# Patient Record
Sex: FEMALE | Race: WHITE | NOT HISPANIC OR LATINO | URBAN - METROPOLITAN AREA
[De-identification: names, ages, dates, MRNs, and addresses within clinical notes are randomized per-mention and may not be internally consistent; named-entity substitution may affect disease eponyms.]

---

## 2018-08-30 ENCOUNTER — INPATIENT (INPATIENT)
Facility: HOSPITAL | Age: 83
LOS: 5 days | Discharge: NURSING, REHAB OR LONG-TERM CARE | End: 2018-09-05
Attending: INTERNAL MEDICINE | Admitting: INTERNAL MEDICINE
Payer: MEDICARE

## 2018-08-30 VITALS
OXYGEN SATURATION: 100 % | HEART RATE: 70 BPM | DIASTOLIC BLOOD PRESSURE: 125 MMHG | RESPIRATION RATE: 16 BRPM | SYSTOLIC BLOOD PRESSURE: 158 MMHG

## 2018-08-30 DIAGNOSIS — I63.9 CEREBRAL INFARCTION, UNSPECIFIED: ICD-10-CM

## 2018-08-30 LAB
ALBUMIN SERPL ELPH-MCNC: 4 G/DL — SIGNIFICANT CHANGE UP (ref 3.3–5)
ALP SERPL-CCNC: 67 U/L — SIGNIFICANT CHANGE UP (ref 40–120)
ALT FLD-CCNC: 24 U/L — SIGNIFICANT CHANGE UP (ref 4–33)
APTT BLD: 26.7 SEC — LOW (ref 27.5–37.4)
AST SERPL-CCNC: 32 U/L — SIGNIFICANT CHANGE UP (ref 4–32)
BASE EXCESS BLDV CALC-SCNC: 1.6 MMOL/L — SIGNIFICANT CHANGE UP
BASOPHILS # BLD AUTO: 0.04 K/UL — SIGNIFICANT CHANGE UP (ref 0–0.2)
BASOPHILS NFR BLD AUTO: 0.6 % — SIGNIFICANT CHANGE UP (ref 0–2)
BILIRUB SERPL-MCNC: 0.3 MG/DL — SIGNIFICANT CHANGE UP (ref 0.2–1.2)
BLD GP AB SCN SERPL QL: NEGATIVE — SIGNIFICANT CHANGE UP
BLOOD GAS VENOUS - CREATININE: 1.06 MG/DL — SIGNIFICANT CHANGE UP (ref 0.5–1.3)
BUN SERPL-MCNC: 27 MG/DL — HIGH (ref 7–23)
CALCIUM SERPL-MCNC: 9.1 MG/DL — SIGNIFICANT CHANGE UP (ref 8.4–10.5)
CHLORIDE BLDV-SCNC: 107 MMOL/L — SIGNIFICANT CHANGE UP (ref 96–108)
CHLORIDE SERPL-SCNC: 104 MMOL/L — SIGNIFICANT CHANGE UP (ref 98–107)
CK MB BLD-MCNC: 1.81 NG/ML — SIGNIFICANT CHANGE UP (ref 1–4.7)
CK SERPL-CCNC: 47 U/L — SIGNIFICANT CHANGE UP (ref 25–170)
CO2 SERPL-SCNC: 20 MMOL/L — LOW (ref 22–31)
CREAT SERPL-MCNC: 1.03 MG/DL — SIGNIFICANT CHANGE UP (ref 0.5–1.3)
EOSINOPHIL # BLD AUTO: 0.11 K/UL — SIGNIFICANT CHANGE UP (ref 0–0.5)
EOSINOPHIL NFR BLD AUTO: 1.7 % — SIGNIFICANT CHANGE UP (ref 0–6)
GAS PNL BLDV: 136 MMOL/L — SIGNIFICANT CHANGE UP (ref 136–146)
GLUCOSE BLDV-MCNC: 157 — HIGH (ref 70–99)
GLUCOSE SERPL-MCNC: 152 MG/DL — HIGH (ref 70–99)
HCO3 BLDV-SCNC: 25 MMOL/L — SIGNIFICANT CHANGE UP (ref 20–27)
HCT VFR BLD CALC: 42.1 % — SIGNIFICANT CHANGE UP (ref 34.5–45)
HCT VFR BLDV CALC: 43.2 % — SIGNIFICANT CHANGE UP (ref 34.5–45)
HGB BLD-MCNC: 13.8 G/DL — SIGNIFICANT CHANGE UP (ref 11.5–15.5)
HGB BLDV-MCNC: 14.1 G/DL — SIGNIFICANT CHANGE UP (ref 11.5–15.5)
IMM GRANULOCYTES # BLD AUTO: 0.02 # — SIGNIFICANT CHANGE UP
IMM GRANULOCYTES NFR BLD AUTO: 0.3 % — SIGNIFICANT CHANGE UP (ref 0–1.5)
INR BLD: 1.12 — SIGNIFICANT CHANGE UP (ref 0.88–1.17)
LACTATE BLDV-MCNC: 2.1 MMOL/L — HIGH (ref 0.5–2)
LYMPHOCYTES # BLD AUTO: 2.42 K/UL — SIGNIFICANT CHANGE UP (ref 1–3.3)
LYMPHOCYTES # BLD AUTO: 37.2 % — SIGNIFICANT CHANGE UP (ref 13–44)
MCHC RBC-ENTMCNC: 30.8 PG — SIGNIFICANT CHANGE UP (ref 27–34)
MCHC RBC-ENTMCNC: 32.8 % — SIGNIFICANT CHANGE UP (ref 32–36)
MCV RBC AUTO: 94 FL — SIGNIFICANT CHANGE UP (ref 80–100)
MONOCYTES # BLD AUTO: 0.65 K/UL — SIGNIFICANT CHANGE UP (ref 0–0.9)
MONOCYTES NFR BLD AUTO: 10 % — SIGNIFICANT CHANGE UP (ref 2–14)
NEUTROPHILS # BLD AUTO: 3.27 K/UL — SIGNIFICANT CHANGE UP (ref 1.8–7.4)
NEUTROPHILS NFR BLD AUTO: 50.2 % — SIGNIFICANT CHANGE UP (ref 43–77)
NRBC # FLD: 0 — SIGNIFICANT CHANGE UP
PCO2 BLDV: 42 MMHG — SIGNIFICANT CHANGE UP (ref 41–51)
PH BLDV: 7.4 PH — SIGNIFICANT CHANGE UP (ref 7.32–7.43)
PLATELET # BLD AUTO: 172 K/UL — SIGNIFICANT CHANGE UP (ref 150–400)
PMV BLD: 11.5 FL — SIGNIFICANT CHANGE UP (ref 7–13)
PO2 BLDV: 47 MMHG — HIGH (ref 35–40)
POTASSIUM BLDV-SCNC: 5.2 MMOL/L — HIGH (ref 3.4–4.5)
POTASSIUM SERPL-MCNC: 4.9 MMOL/L — SIGNIFICANT CHANGE UP (ref 3.5–5.3)
POTASSIUM SERPL-SCNC: 4.9 MMOL/L — SIGNIFICANT CHANGE UP (ref 3.5–5.3)
PROT SERPL-MCNC: 7.4 G/DL — SIGNIFICANT CHANGE UP (ref 6–8.3)
PROTHROM AB SERPL-ACNC: 12.9 SEC — SIGNIFICANT CHANGE UP (ref 9.8–13.1)
RBC # BLD: 4.48 M/UL — SIGNIFICANT CHANGE UP (ref 3.8–5.2)
RBC # FLD: 14.6 % — HIGH (ref 10.3–14.5)
RH IG SCN BLD-IMP: POSITIVE — SIGNIFICANT CHANGE UP
RH IG SCN BLD-IMP: POSITIVE — SIGNIFICANT CHANGE UP
SAO2 % BLDV: 82.7 % — SIGNIFICANT CHANGE UP (ref 60–85)
SODIUM SERPL-SCNC: 139 MMOL/L — SIGNIFICANT CHANGE UP (ref 135–145)
TROPONIN T, HIGH SENSITIVITY: 9 NG/L — SIGNIFICANT CHANGE UP (ref ?–14)
WBC # BLD: 6.51 K/UL — SIGNIFICANT CHANGE UP (ref 3.8–10.5)
WBC # FLD AUTO: 6.51 K/UL — SIGNIFICANT CHANGE UP (ref 3.8–10.5)

## 2018-08-30 PROCEDURE — 70498 CT ANGIOGRAPHY NECK: CPT | Mod: 26

## 2018-08-30 PROCEDURE — 70496 CT ANGIOGRAPHY HEAD: CPT | Mod: 26

## 2018-08-30 PROCEDURE — 71045 X-RAY EXAM CHEST 1 VIEW: CPT | Mod: 26

## 2018-08-30 PROCEDURE — 99291 CRITICAL CARE FIRST HOUR: CPT

## 2018-08-30 PROCEDURE — 70450 CT HEAD/BRAIN W/O DYE: CPT | Mod: 26,59

## 2018-08-30 RX ORDER — TETANUS TOXOID, REDUCED DIPHTHERIA TOXOID AND ACELLULAR PERTUSSIS VACCINE, ADSORBED 5; 2.5; 8; 8; 2.5 [IU]/.5ML; [IU]/.5ML; UG/.5ML; UG/.5ML; UG/.5ML
0.5 SUSPENSION INTRAMUSCULAR ONCE
Qty: 0 | Refills: 0 | Status: DISCONTINUED | OUTPATIENT
Start: 2018-08-30 | End: 2018-09-05

## 2018-08-30 RX ORDER — ALTEPLASE 100 MG
5.7 KIT INTRAVENOUS ONCE
Qty: 0 | Refills: 0 | Status: COMPLETED | OUTPATIENT
Start: 2018-08-30 | End: 2018-08-30

## 2018-08-30 RX ORDER — HYDRALAZINE HCL 50 MG
5 TABLET ORAL ONCE
Qty: 0 | Refills: 0 | Status: COMPLETED | OUTPATIENT
Start: 2018-08-30 | End: 2018-08-30

## 2018-08-30 RX ORDER — ALTEPLASE 100 MG
51.4 KIT INTRAVENOUS ONCE
Qty: 0 | Refills: 0 | Status: COMPLETED | OUTPATIENT
Start: 2018-08-30 | End: 2018-08-30

## 2018-08-30 RX ORDER — LABETALOL HCL 100 MG
10 TABLET ORAL ONCE
Qty: 0 | Refills: 0 | Status: COMPLETED | OUTPATIENT
Start: 2018-08-30 | End: 2018-08-30

## 2018-08-30 RX ORDER — CHLORHEXIDINE GLUCONATE 213 G/1000ML
1 SOLUTION TOPICAL
Qty: 0 | Refills: 0 | Status: DISCONTINUED | OUTPATIENT
Start: 2018-08-30 | End: 2018-08-31

## 2018-08-30 RX ADMIN — Medication 5 MILLIGRAM(S): at 20:15

## 2018-08-30 RX ADMIN — ALTEPLASE 51.4 MILLIGRAM(S): KIT at 20:22

## 2018-08-30 RX ADMIN — Medication 5 MILLIGRAM(S): at 20:20

## 2018-08-30 RX ADMIN — ALTEPLASE 342 MILLIGRAM(S): KIT at 20:57

## 2018-08-30 NOTE — CONSULT NOTE ADULT - ATTENDING COMMENTS
I have seen and examined this patient with the stroke neurology team.     History was reviewed with the patient and/or available family members.   ROS: All negative except documented in the HPI.   Neurological exam was performed and agree with exam as documented above.   Laboratory results and imaging studies were reviewed by me.   I agree with the neurology resident note as documented above.    88 years old woman with multiple vascular risk factors including A. fib-on therapeutic anticoagulation with rivaroxaban is evaluated at [facility] for acute onset of left-sided weakness, left facial droop and dysarthria on 8/30. CT brain on admission did not show any evidence of acute infarct or hemorrhage and she was subsequently treated with IV tPA. CTA head and neck showed right PCA P2 occlusion but is otherwise unremarkable. CT brain performed on 8/31 showed hypodensity in thalamocapsular region concerning for acute infarct to my eye.     Impression:  Cerebral embolism with cerebral infarction. Right MCA versus PCA distribution stroke - likely etiology being in the setting of underlying atrial fibrillation (suspect rivaroxaban failure) versus small vessel disease, during the former    Plan:   - Continue with  24 hours post IV tPA precautions including BP goal<180/105 mmHg for first 24 hours followed by gradual normotension as per protocol  - Not a candidate for neurosurgical intervention due to CTA H/N findings   - MRI brain without contrast; alternatively, could perform CT brain in about 48 hours to evaluate for the size of the infarct   - Aspirin and pharmacological DVT prophylaxis to be considered at 24 hours after the IV tPA and after repeating brain imaging, SCDs for DVT prophylaxis in the interim  - Would optimally benefit from restarting therapeutic anticoagulation (prefer DOACs like apixaban) for secondary stroke prevention in about 48-72 hours depending upon the size of the infarct on repeat brain imaging   - Atorvastatin 80 mg at bedtime after establishing enteral access or passing swallow evaluation  - TTE with bubble study and continue with telemetry   - Management of A. Fib as per primary team   - HbA1C and LDL, continue with aggressive vascular risk factors modifications   - PT/OT/Speech and swallow evaluation   - Would continue to follow    Above mentioned plan was discussed with patient, her son over the phone and her home health aide at the bedside in detail. All the questions were answered and concerns were addressed. More than 84 minutes were spent in evaluation and management of this critically ill and unstable patient with acute stroke.

## 2018-08-30 NOTE — H&P ADULT - HISTORY OF PRESENT ILLNESS
Pt is a 87 yo woman w/ afib ( on xarelto and metoprolol), HLD, anxiety presented to ED complaining of L sided weakness. Pt reported that she was eating dinner with a friend this afternoon when patient suddenly "did not feel well." Pt reported that she had finished dinner, paid the bill, and was on the way out of the restaurant when she started having difficulty moving her left leg and left arm. Pt stated that she "fainted," however son stated that she did not fall or hit her head. Pt reported that she did not experience any N/V/D, CP, SOB, dyspnea, or abdominal pain.     In the ED:   Vital Signs Last 24 Hrs  T(C): 36.7 (30 Aug 2018 21:33), Max: 36.7 (30 Aug 2018 21:33)  T(F): 98 (30 Aug 2018 21:33), Max: 98 (30 Aug 2018 21:33)  HR: 77 (30 Aug 2018 22:30) (65 - 77)  BP: 143/54 (30 Aug 2018 22:30) (140/49 - 188/75)  BP(mean): 80 (30 Aug 2018 22:30) (77 - 89)  RR: 21 (30 Aug 2018 22:30) (16 - 24)  SpO2: 96% (30 Aug 2018 22:30) (95% - 100%)    Stroke code was called (NIH stroke scale: 7), patient was given tPa and admitted to MICU. Pt is a 89 yo woman w/ afib (on xarelto, metoprolol, amiodarone), HLD, anxiety presented to ED complaining of L sided weakness. Pt reported that she was eating dinner with a friend this afternoon when patient suddenly "did not feel well." Pt reported that she had finished dinner, paid the bill, and was on the way out of the restaurant when she started having difficulty moving her left leg and left arm. Pt stated that she "fainted," however son stated that she did not fall or hit her head. Pt reported that she did not experience any N/V/D, CP, SOB, dyspnea, or abdominal pain.     In the ED:   Vital Signs Last 24 Hrs  T(C): 36.7 (30 Aug 2018 21:33), Max: 36.7 (30 Aug 2018 21:33)  T(F): 98 (30 Aug 2018 21:33), Max: 98 (30 Aug 2018 21:33)  HR: 77 (30 Aug 2018 22:30) (65 - 77)  BP: 143/54 (30 Aug 2018 22:30) (140/49 - 188/75)  BP(mean): 80 (30 Aug 2018 22:30) (77 - 89)  RR: 21 (30 Aug 2018 22:30) (16 - 24)  SpO2: 96% (30 Aug 2018 22:30) (95% - 100%)    Stroke code was called (NIH stroke scale: 7), patient was given tPa and admitted to MICU.

## 2018-08-30 NOTE — ED ADULT TRIAGE NOTE - CADM TRG TX PRIOR TO ARRIVAL
[facility]    Jomar Cabral Jr. is a 85 y.o. male who is now on pharmacy to dose enoxaparin for ppx per Olash's request.     He  has a past medical history of Acquired hypothyroidism (3/8/2016); Acquired pes planus; Actinic keratosis; Alzheimer disease; Alzheimer's dementia (3/8/2016); Anemia; Anxiety; Atrial fibrillation (3/8/2016); BPH (benign prostatic hyperplasia); Cancer; Colon polyps; Depression; Disease of thyroid gland; Diverticulosis; Essential hypertension (3/8/2016); HTN (hypertension); Hyperlipidemia; Hypertension; Hypothyroid; Neoplasm of pituitary gland; Osteoarthritis of multiple joints (3/8/2016); Osteoporosis; Osteoporosis; Partial small bowel obstruction; Renal insufficiency; RLS (restless legs syndrome); Sleep apnea (3/8/2016); Venous insufficiency; and Vertigo.    Relevant clinical data and objective history reviewed:  70\" (177.8 cm)  188 lb (85.3 kg)  Body mass index is 26.98 kg/(m^2).     Home Anticoagulation: none, but on asa     Allergies as of 03/10/2017 - Bishnu as Reviewed 03/10/2017   Allergen Reaction Noted   • No known drug allergy  03/06/2016     Lab Results   Component Value Date    WBC 6.89 03/10/2017    HGB 14.2 03/10/2017    HCT 44.0 03/10/2017    MCV 97.6 (H) 03/10/2017     03/10/2017     PLATELETS   Date Value Ref Range Status   03/10/2017 227 140 - 500 10*3/mm3 Final     Estimated Creatinine Clearance: 47.3 mL/min (by C-G formula based on Cr of 1.18).    Assessment/Plan  Reviewed chart   Renal function is acutely impaired   Platelets are wnl     1) enoxaparin 40 mg subcutaneous every 24 hours  2) Labs: will be scheduled as needed       Pharmacy will continue to follow daily while on enoxaparin and adjust as needed.     Radah Plasencia, PharmD, BCPS  3/10/2017 1:44 PM     " none

## 2018-08-30 NOTE — H&P ADULT - NSHPLABSRESULTS_GEN_ALL_CORE
13.8   6.51  )-----------( 172      ( 30 Aug 2018 20:11 )             42.1     Auto Eosinophil # 0.11  / Auto Eosinophil % 1.7   / Auto Neutrophil # 3.27  / Auto Neutrophil % 50.2  / BANDS % x        08-30    139  |  104  |  27<H>  ----------------------------<  152<H>  4.9   |  20<L>  |  1.03    Ca    9.1      30 Aug 2018 20:11  TPro  7.4  /  Alb  4.0  /  TBili  0.3  /  DBili  x   /  AST  32  /  ALT  24  /  AlkPhos  67  08-30    PT/INR - ( 30 Aug 2018 20:11 )   PT: 12.9 SEC;   INR: 1.12          PTT - ( 30 Aug 2018 20:11 )  PTT:26.7 SEC  CARDIAC MARKERS ( 30 Aug 2018 20:11 )  CK 47 u/L / CKMB 1.81 ng/mL      < from: Xray Chest 1 View AP/PA (08.30.18 @ 20:29) >    INTERPRETATION:  Increased interstitial prominence may represent   pulmonary edema in the appropriate clinical context.    < end of copied text >    < from: CT Angio Head w/ IV Cont (08.30.18 @ 20:05) >    CT angiography neck: No evidence of hemodynamically significant stenosis   using NASCET criteria.  Patent vertebral arteries.  No evidence of   vascular dissection.    CT angiography brain: Occlusion of the right posterior cerebral artery.    Stenosis of the left vertebral artery.    No other significant stenosis or vessel occlusion identified.    < end of copied text >    < from: CT Brain Stroke Protocol (08.30.18 @ 19:50) >    IMPRESSION:    No acute intracranial hemorrhage, mass effect or midline shift.    < end of copied text >

## 2018-08-30 NOTE — CONSULT NOTE ADULT - ASSESSMENT
89YO F with HLD and sudden onset left hemiparesis and left facial droop due to acute right hemispheric ischemic event. NIHSS 7 MRS 0. CT Head showed no hemorrhage. tPA bolus given after risks/benefits discussed. CTA Head/Neck shows R PCA occlusion. Likely right thalamo-capsular infarction due to artery to artery occlusion of PCA perforators. Admit to MICU for stroke workup and management.       [] post tPA protocol  [] permissive hypertension for first 24hrs <180/105 post tPA, followed by gradual normotension  [] MRI Brain w/o  [] TTE, tele monitor for Afib, +/- loop  [] Rp Head CT 24hr to start ASA 81 daily and DVT ppx   [] atorvastatin 80 when able to take PO  [] check A1C, lipid panel   [] dysphagia screen  [] speech/swallow  [] PT/OT

## 2018-08-30 NOTE — ED PROVIDER NOTE - ATTENDING CONTRIBUTION TO CARE
Devon: 89 yo female with a h/o a fib on xarelto ( history initially not known by patient or family), dementia and HTN BIBEMS with acute onset left sided upper and lower Devon: 87 yo female with a h/o a fib on xarelto ( history initially not known by patient or family), dementia and HTN BIBEMS with acute onset left sided upper and lower extremity weakness which began while eating dinner. Pt denies headache, chest pain, SOB, recent illness, fevesr and chills. Pt initially denied anticoagulation. Exam: GENERAL: well appearing, NAD, HEENT: MMM, PERRLA, CARDIO: +S1/S2, no murmurs, rubs or gallops, LUNGS: CTA B/L, no wheezing, rales or rhonchi, ABD: soft, nontender, BSx4 quadrants, no guarding or rigidity. EXT: 2/5 LUE strength, 3/5 LLE strength, right sided 5/5.  2+ distal pulses x 4 extremities. NEURO: AxOx3, left sided facial droop SKIN: no rashes or lesions, well perfused A/P- 87 yo female with CVA. code stroke called, neuro evaluated, will likely receive TPA.

## 2018-08-30 NOTE — ED ADULT NURSE NOTE - OBJECTIVE STATEMENT
Pt s/p Code Stroke x1hr prior. Pt was at dinner with family when she "didn't feel well" stood up from table and slid down to floor. EMS arrived and found pt unable to move left arm and leg and left sided facial droop. Pt A&O x3, repeats same questions over and over. CT & CTA performed. Neuro recc TPA and TPA bolus given at 2022 after BP control w/ hydralazine. 2 PIV places and labs drawn and sent as ordered.

## 2018-08-30 NOTE — ED PROVIDER NOTE - PROGRESS NOTE DETAILS
[place] stroke eval - 87yo female no pmh, on no meds, p/w 1 hour left sided facial droop and left upper and lower extremity weakness, started at dinner. On exam, left facial droop noted, left sided sensory deficit, left upper and lower extremity weakness with pronator drift. fingerstick 153, code stroke called and neurology called. pt taken to CT scanner Devon: Pt evaluated by myself and neurology prior to CT, CT non con negative, pt consented for TPA Devon: TPA pushed at 8:22

## 2018-08-30 NOTE — ED PROVIDER NOTE - PHYSICAL EXAMINATION
***GEN - well appearing; NAD   ***HEAD - NC/AT  ***EYES/NOSE - PEERL, EOMI, mucous membranes moist, no discharge   ***THROAT: Oral cavity and pharynx normal. No inflammation, swelling, exudate, or lesions.    ***NECK: supple, non-tender no lymphadenopathy  ***PULMONARY - CTA b/l, symmetric breath sounds.   ***CARDIAC- s1s2, RRR, no murmur  ***ABDOMEN - +BS, ND, NT, soft, no guarding, no rebound, no organomegaly  ***BACK - no CVA tenderness, Normal  spine, no spinal TTP  ***EXTREMITIES - symmetric pulses, 2+ dp, capillary refill < 2 seconds, no clubbing, no cyanosis, no edema   ***SKIN - warm, dry, intact, no rash or bruising   ***NEUROLOGIC - EOMI, PERRL, VFF, V1-V3 intact, mild L facial droop, tongue midline.  2/5 LUE, 3/5 LLE. RUE/RLE NO Drift. Sensation: decreased sensation on L arm, leg.  Coordination: FTN intact b/l, no dysmetria

## 2018-08-30 NOTE — ED ADULT TRIAGE NOTE - CHIEF COMPLAINT QUOTE
Pt presents with right sided facial droop and left sided weakness.  No slurred speech.  Last seen normal 45 min ago.  JARED called to assess.  Code stroke called.  .  Pt to CT

## 2018-08-30 NOTE — H&P ADULT - NSHPPHYSICALEXAM_GEN_ALL_CORE
GENERAL APPEARANCE: Laying comfortably in bed  HEENT:  PERRL, EOMI. Sclera anicteric;  NECK:  Non-tender without lymphadenopathy or masses   CARDIAC: Normal S1 and S2. No S3, S4 or murmurs. Rhythm is regular.  LUNGS: Clear to auscultation and percussion without rales, rhonchi, wheezing or diminished breath sounds.  ABDOMEN: Positive bowel sounds. Soft, nondistended, nontender. No guarding or rebound.   MUSCULOSKELETAL: No joint erythema or tenderness.   EXTREMITIES: No significant deformity or joint abnormality. No edema. Peripheral pulses intact.   NEUROLOGICAL: PERRL, EOMI, Decreased sensation on left side of face V1-V3, left side facial droop, hearing intact to finger rub, uvula midline, SCM/trap L: 5/5  R: 4/5, tongue protrudes midline. Strength 5/5 on R. Strength 3/5 on left upper and lower extremities. Decreased sensation in left upper and lower extremities.   SKIN: Skin normal color, texture and turgor with no lesions or eruptions.  PSYCHIATRIC: AOx3. Pt anxious, worried.

## 2018-08-30 NOTE — ED ADULT NURSE NOTE - NSIMPLEMENTINTERV_GEN_ALL_ED
Implemented All Fall with Harm Risk Interventions:  [place] to call system. Call bell, personal items and telephone within reach. Instruct patient to call for assistance. Room bathroom lighting operational. Non-slip footwear when patient is off stretcher. Physically safe environment: no spills, clutter or unnecessary equipment. Stretcher in lowest position, wheels locked, appropriate side rails in place. Provide visual cue, wrist band, yellow gown, etc. Monitor gait and stability. Monitor for mental status changes and reorient to person, place, and time. Review medications for side effects contributing to fall risk. Reinforce activity limits and safety measures with patient and family. Provide visual clues: red socks.

## 2018-08-30 NOTE — H&P ADULT - ASSESSMENT
Pt is a 89 yo woman w/ afib ( on xarelto and metoprolol), HLD, anxiety presented to ED complaining of L sided weakness 2/2 to R sided CVA (R PCA occlusion on CTA) s/p tPa admitted to MICU for close neurological follow up. Pt is a 87 yo woman w/ afib ( on xarelto and metoprolol), HLD, anxiety presented to ED complaining of L sided weakness 2/2 to R sided CVA (R PCA occlusion on CTA) s/p tPa admitted to MICU for close neurological follow up.     #Neuro  - Pt alert and oriented   - S/p tPa  - CT Head negative for acute bleed/infarct  - CTA Head showed R PCA occlusion  - q30 min neuro checks  - Repeat 24 hr CT Head to start ASA 81 daily and DVT ppx as per neurology   - Speech and swallow evaluation  - PT/OT    #Resp  - Currently O2 sat: > 95 on room air, no respiratory distress  - Continue monitoring respiratory status    #Cardio   - hx of afib (on xarelto/ metoprolol); EKG wnl  - permissive hypertension for first 24hrs <180/105 post tPA, followed by gradual normotension per neurology  - tele monitoring for afib    #GI  - No PPI drip indicated    #Endocrine  -     #ID  - no concern for infection    #Metabolic  - No concern for metabolic disturbances    #Prophylaxis   - S/p tPa Pt is a 89 yo woman w/ afib ( on xarelto and metoprolol), HLD, anxiety presented to ED complaining of L sided weakness 2/2 to R sided CVA (R PCA occlusion on CTA) s/p tPa admitted to MICU for close neurological follow up.     #Neuro  - Pt alert and oriented   - S/p tPa; NIHSS: 7  - Initial CT Head negative for acute bleed/infarct  - CTA Head showed R PCA occlusion; L vertebral artery stenosis  - frequent neuro checks  - Repeat 24 hr CT Head to r/o hemorraghic conversion to start ASA 81 daily and DVT ppx as per neurology   - Speech and swallow evaluation  - TTE to r/o thrombus/ embolic phenomena  - PT/OT    #Resp  - Currently O2 sat: > 95 on room air, no respiratory distress  - Continue monitoring respiratory status    #Cardio   - hx of afib (on xarelto/ metoprolol); Rate controlled at this time off metoprolol; EKG NSR.  - holding home metoprolol until repeat head CT for permissive hypertension for first 24hrs   - Goal BP: <180/105 post tPA, followed by gradual normotension per neurology  - tele monitoring for afib    #GI  - No acute issues    #ID  - no concern for infection    #Metabolic  - No concern for metabolic disturbances    #Prophylaxis   - Holding VTE prophylaxis pending 24 hour neuro checks    Mitch Zepeda MD PGY1 Pt is a 89 yo woman w/ afib ( on xarelto and metoprolol), HLD, anxiety presented to ED complaining of L sided weakness 2/2 to R sided CVA (R PCA occlusion on CTA) s/p tPa admitted to MICU for close neurological follow up.     #Neuro  - Pt alert and oriented   - S/p tPa; NIHSS: 7  - Initial CT Head negative for acute bleed/infarct  - CTA Head showed R PCA occlusion; L vertebral artery stenosis  - frequent neuro checks  - Repeat 24 hr CT Head to r/o hemorraghic conversion to start ASA 81 daily and DVT ppx as per neurology   - Speech and swallow evaluation  - TTE to r/o thrombus/ embolic phenomena  - PT/OT    #Resp  - Currently O2 sat: > 95 on room air, no respiratory distress  - Continue monitoring respiratory status    #Cardio   - hx of afib (on xarelto/ metoprolol/ amiodarone); Rate/rhythm controlled at this time off metoprolol/amio; EKG NSR.  - holding home metoprolol/amiodarone until repeat head CT for permissive hypertension for first 24hrs   - Goal BP: <180/105 post tPA, followed by gradual normotension per neurology  - tele monitoring for afib    #GI  - No acute issues    #ID  - no concern for infection    #Metabolic  - No concern for metabolic disturbances    #Prophylaxis   - Holding VTE prophylaxis pending 24 hour neuro checks    Mitch Zepeda MD PGY1

## 2018-08-30 NOTE — H&P ADULT - NSHPREVIEWOFSYSTEMS_GEN_ALL_CORE
CONSTITUTIONAL:  No weight loss, fever, chills, weakness or fatigue.  HEENT:  Eyes:  No visual loss Throat:  No hearing loss, sneezing, congestion, runny nose or sore throat.  SKIN:  No rash or itching.  CARDIOVASCULAR:  No chest pain, chest pressure or chest discomfort. No palpitations   RESPIRATORY:  No shortness of breath, dyspnea  GASTROINTESTINAL:  No anorexia, nausea, vomiting or diarrhea. No abdominal pain or blood.  GENITOURINARY:  Denies hematuria, dysuria.   NEUROLOGICAL:  See HPI  MUSCULOSKELETAL:  See HPI  HEMATOLOGIC:  No anemia, bleeding or bruising.  LYMPHATICS:  No enlarged nodes.   PSYCHIATRIC:  Hx of anxiety  ENDOCRINOLOGIC:  No reports of sweating, cold or heat intolerance. No polyuria or polydipsia.  ALLERGIES:  No history of asthma, hives, eczema or rhinitis.

## 2018-08-30 NOTE — CONSULT NOTE ADULT - SUBJECTIVE AND OBJECTIVE BOX
NEUROLOGY CONSULT     87 YO RHF with HLD on no meds at home presents with sudden onset L sided weakness and facial droop, slid out of chair while sitting down at dinner with her cousin and unable to get up. No shaking like activity or LOC. No similar symptoms in the past. EMS was immediately called and code stroke called in ED. Last known normal about 1 hour prior ~1845. Upon my arrival pt with mild-moderate L facial droop, L arm 2/5, L leg 3/5, and decreased sensation on L side. No gaze deviation/preference or cortical signs. NIHSS 7. CT Head shows no hemorrhage. No contraindications to tPA. RIsks/ benefits discussed with pt in detail and agrees with tPA - pushed at 20:22. No other associated symptoms or complaints.       PAST MEDICAL & SURGICAL HISTORY:  Hyperlipidemia  Familial tremor      Allergies    No Known Allergies    Intolerances        MEDICATIONS  (STANDING):  alteplase    Bolus 5.7 milliGRAM(s) IV Bolus once  diphtheria/tetanus/pertussis (acellular) Vaccine (ADAcel) 0.5 milliLiter(s) IntraMuscular once  labetalol Injectable 10 milliGRAM(s) IV Push once    MEDICATIONS  (PRN):      SOCIAL HISTORY: Denies tobacco/EtOH/drug use     FAMILY HISTORY:      REVIEW OF SYSTEMS:  CONSTITUTIONAL:  No weight loss, fever, chills, weakness or fatigue.  HEENT:  Eyes:  No visual loss, blurred vision, double vision or yellow sclerae. Ears, Nose, Throat:  No hearing loss, sneezing, congestion, runny nose or sore throat.  SKIN:  No rash or itching.  CARDIOVASCULAR:  No chest pain, chest pressure or chest discomfort. No palpitations or edema.  RESPIRATORY:  No shortness of breath, cough or sputum.  GASTROINTESTINAL:  No anorexia, nausea, vomiting or diarrhea. No abdominal pain or blood.  GENITOURINARY:  denies incontinence/retention   NEUROLOGICAL:  see hpi  MUSCULOSKELETAL:  No muscle, back pain, joint pain or stiffness.  HEMATOLOGIC:  No anemia, bleeding or bruising.  LYMPHATICS:  No enlarged nodes. No history of splenectomy.  PSYCHIATRIC:  No history of depression or anxiety.  ENDOCRINOLOGIC:  No reports of sweating, cold or heat intolerance. No polyuria or polydipsia.      Vital Signs Last 24 Hrs  T(C): --  T(F): --  HR: 65 (30 Aug 2018 20:22) (65 - 70)  BP: 169/78 (30 Aug 2018 20:22) (158/125 - 188/75)  BP(mean): --  RR: 18 (30 Aug 2018 20:22) (16 - 18)  SpO2: 100% (30 Aug 2018 20:22) (100% - 100%)    PHYSICAL EXAM:   General appearance: No acute distress                 Mental Status: AAOx3, fluent speech, follows simple commands, able to name  Cranial Nerves: EOMI, PERRL, VFF, V1-V3 intact, mild L facial, tongue midline  Motor: 2/5 LUE, 3/5 LLE. RUE/RLE NO Drift.  Sensation: decreased sensation on L arm, leg   Coordination: FTN intact b/l, no dysmetria   Gait: deferred     NIHSS 7     LABS:                          13.8   6.51  )-----------( 172      ( 30 Aug 2018 20:11 )             42.1     08-30    139  |  104  |  27<H>  ----------------------------<  152<H>  4.9   |  20<L>  |  1.03    Ca    9.1      30 Aug 2018 20:11    TPro  7.4  /  Alb  4.0  /  TBili  0.3  /  DBili  x   /  AST  32  /  ALT  24  /  AlkPhos  67  08-30      IMAGING:    CT HEAD:    FINDINGS:     There is no acute intra-axial or extra-axial hemorrhage. There is no mass   effect, effacement of the basal cisterns or shift of midline structures.    Prominent ventricles and sulci are compatible with age-appropriate   cerebral volume loss. Patchy lucency in the perivertricular white matter   is consistent with chronic white matter microvascular disease..    The visualized paranasal sinuses, mastoid air cells and middle ear   cavities are clear.    There is no displaced calvarial fracture.      IMPRESSION:    No acute intracranial hemorrhage, mass effect or midline shift.          CT ANGIOGRAPHY BRAIN:  There is abrupt termination of the right posterior cerebral artery at the   P1/P2 junction. The posterior communicating arteries are aplastic or   hypoplastic. The anterior communicating artery is intact. The remaining   cerebral arteries are normal in appearance.    There is stenosis of the distal left vertebral artery at the   vertebrobasilar junction. The remaining vertebral basilar system is   unremarkable. Atherosclerosis is seen involving the left V3 and V4   segments.    No enlarged vascular lesions or clusters of abnormal vessels are noted to   suggest an arterial venous malformation within the field-of-view.    Visualized portions of the superficial and deep venous systems are   unremarkable.      IMPRESSION:     CT angiography neck: No evidence of hemodynamically significant stenosis   using NASCET criteria.  Patent vertebral arteries.  No evidence of   vascular dissection.    CT angiography brain: Occlusion of the right posterior cerebral artery.    Stenosis of the left vertebral artery.    No other significant stenosis or vessel occlusion identified.
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
## 2018-08-30 NOTE — H&P ADULT - NSHPSOCIALHISTORY_GEN_ALL_CORE
Pt live alone at [facility]. Pt ambulates and performs all ADLs without assistance. Pt was former smoker ( ~ 16 yr pack history; quit about 10 years ago). No ETOH or drug use.

## 2018-08-31 LAB
ALBUMIN SERPL ELPH-MCNC: 3.9 G/DL — SIGNIFICANT CHANGE UP (ref 3.3–5)
ALP SERPL-CCNC: 61 U/L — SIGNIFICANT CHANGE UP (ref 40–120)
ALT FLD-CCNC: 20 U/L — SIGNIFICANT CHANGE UP (ref 4–33)
AST SERPL-CCNC: 24 U/L — SIGNIFICANT CHANGE UP (ref 4–32)
BASOPHILS # BLD AUTO: 0.04 K/UL — SIGNIFICANT CHANGE UP (ref 0–0.2)
BASOPHILS NFR BLD AUTO: 0.5 % — SIGNIFICANT CHANGE UP (ref 0–2)
BILIRUB SERPL-MCNC: 0.5 MG/DL — SIGNIFICANT CHANGE UP (ref 0.2–1.2)
BUN SERPL-MCNC: 22 MG/DL — SIGNIFICANT CHANGE UP (ref 7–23)
CALCIUM SERPL-MCNC: 8.9 MG/DL — SIGNIFICANT CHANGE UP (ref 8.4–10.5)
CHLORIDE SERPL-SCNC: 104 MMOL/L — SIGNIFICANT CHANGE UP (ref 98–107)
CHOLEST SERPL-MCNC: 213 MG/DL — HIGH (ref 120–199)
CO2 SERPL-SCNC: 20 MMOL/L — LOW (ref 22–31)
CREAT SERPL-MCNC: 0.79 MG/DL — SIGNIFICANT CHANGE UP (ref 0.5–1.3)
EOSINOPHIL # BLD AUTO: 0.03 K/UL — SIGNIFICANT CHANGE UP (ref 0–0.5)
EOSINOPHIL NFR BLD AUTO: 0.3 % — SIGNIFICANT CHANGE UP (ref 0–6)
GLUCOSE SERPL-MCNC: 117 MG/DL — HIGH (ref 70–99)
HBA1C BLD-MCNC: 5.8 % — HIGH (ref 4–5.6)
HCT VFR BLD CALC: 40.9 % — SIGNIFICANT CHANGE UP (ref 34.5–45)
HDLC SERPL-MCNC: 87 MG/DL — HIGH (ref 45–65)
HGB BLD-MCNC: 13.4 G/DL — SIGNIFICANT CHANGE UP (ref 11.5–15.5)
IMM GRANULOCYTES # BLD AUTO: 0.03 # — SIGNIFICANT CHANGE UP
IMM GRANULOCYTES NFR BLD AUTO: 0.3 % — SIGNIFICANT CHANGE UP (ref 0–1.5)
LIPID PNL WITH DIRECT LDL SERPL: 128 MG/DL — SIGNIFICANT CHANGE UP
LYMPHOCYTES # BLD AUTO: 1.59 K/UL — SIGNIFICANT CHANGE UP (ref 1–3.3)
LYMPHOCYTES # BLD AUTO: 18 % — SIGNIFICANT CHANGE UP (ref 13–44)
MAGNESIUM SERPL-MCNC: 2.3 MG/DL — SIGNIFICANT CHANGE UP (ref 1.6–2.6)
MCHC RBC-ENTMCNC: 30.5 PG — SIGNIFICANT CHANGE UP (ref 27–34)
MCHC RBC-ENTMCNC: 32.8 % — SIGNIFICANT CHANGE UP (ref 32–36)
MCV RBC AUTO: 93 FL — SIGNIFICANT CHANGE UP (ref 80–100)
MONOCYTES # BLD AUTO: 0.97 K/UL — HIGH (ref 0–0.9)
MONOCYTES NFR BLD AUTO: 11 % — SIGNIFICANT CHANGE UP (ref 2–14)
NEUTROPHILS # BLD AUTO: 6.19 K/UL — SIGNIFICANT CHANGE UP (ref 1.8–7.4)
NEUTROPHILS NFR BLD AUTO: 69.9 % — SIGNIFICANT CHANGE UP (ref 43–77)
NRBC # FLD: 0 — SIGNIFICANT CHANGE UP
PHOSPHATE SERPL-MCNC: 2.3 MG/DL — LOW (ref 2.5–4.5)
PLATELET # BLD AUTO: 179 K/UL — SIGNIFICANT CHANGE UP (ref 150–400)
PMV BLD: 11.6 FL — SIGNIFICANT CHANGE UP (ref 7–13)
POTASSIUM SERPL-MCNC: 4.3 MMOL/L — SIGNIFICANT CHANGE UP (ref 3.5–5.3)
POTASSIUM SERPL-SCNC: 4.3 MMOL/L — SIGNIFICANT CHANGE UP (ref 3.5–5.3)
PROT SERPL-MCNC: 7 G/DL — SIGNIFICANT CHANGE UP (ref 6–8.3)
RBC # BLD: 4.4 M/UL — SIGNIFICANT CHANGE UP (ref 3.8–5.2)
RBC # FLD: 14.6 % — HIGH (ref 10.3–14.5)
SODIUM SERPL-SCNC: 139 MMOL/L — SIGNIFICANT CHANGE UP (ref 135–145)
TRIGL SERPL-MCNC: 57 MG/DL — SIGNIFICANT CHANGE UP (ref 10–149)
WBC # BLD: 8.85 K/UL — SIGNIFICANT CHANGE UP (ref 3.8–10.5)
WBC # FLD AUTO: 8.85 K/UL — SIGNIFICANT CHANGE UP (ref 3.8–10.5)

## 2018-08-31 PROCEDURE — 93306 TTE W/DOPPLER COMPLETE: CPT | Mod: 26

## 2018-08-31 PROCEDURE — 99223 1ST HOSP IP/OBS HIGH 75: CPT | Mod: GC

## 2018-08-31 PROCEDURE — 70450 CT HEAD/BRAIN W/O DYE: CPT | Mod: 26,77

## 2018-08-31 PROCEDURE — 70450 CT HEAD/BRAIN W/O DYE: CPT | Mod: 26

## 2018-08-31 PROCEDURE — 70551 MRI BRAIN STEM W/O DYE: CPT | Mod: 26

## 2018-08-31 RX ORDER — ASPIRIN/CALCIUM CARB/MAGNESIUM 324 MG
81 TABLET ORAL DAILY
Qty: 0 | Refills: 0 | Status: DISCONTINUED | OUTPATIENT
Start: 2018-08-31 | End: 2018-09-04

## 2018-08-31 RX ORDER — ONDANSETRON 8 MG/1
4 TABLET, FILM COATED ORAL ONCE
Qty: 0 | Refills: 0 | Status: COMPLETED | OUTPATIENT
Start: 2018-08-31 | End: 2018-08-31

## 2018-08-31 RX ORDER — ENOXAPARIN SODIUM 100 MG/ML
40 INJECTION SUBCUTANEOUS DAILY
Qty: 0 | Refills: 0 | Status: DISCONTINUED | OUTPATIENT
Start: 2018-08-31 | End: 2018-09-04

## 2018-08-31 RX ORDER — ATORVASTATIN CALCIUM 80 MG/1
80 TABLET, FILM COATED ORAL AT BEDTIME
Qty: 0 | Refills: 0 | Status: DISCONTINUED | OUTPATIENT
Start: 2018-08-31 | End: 2018-09-05

## 2018-08-31 RX ADMIN — ATORVASTATIN CALCIUM 80 MILLIGRAM(S): 80 TABLET, FILM COATED ORAL at 23:29

## 2018-08-31 RX ADMIN — ENOXAPARIN SODIUM 40 MILLIGRAM(S): 100 INJECTION SUBCUTANEOUS at 23:29

## 2018-08-31 RX ADMIN — CHLORHEXIDINE GLUCONATE 1 APPLICATION(S): 213 SOLUTION TOPICAL at 08:06

## 2018-08-31 RX ADMIN — ONDANSETRON 4 MILLIGRAM(S): 8 TABLET, FILM COATED ORAL at 03:50

## 2018-08-31 NOTE — SWALLOW BEDSIDE ASSESSMENT ADULT - SWALLOW EVAL: RECOMMENDED FEEDING/EATING TECHNIQUES
allow for swallow between intakes/maintain upright posture during/after eating for 30 mins/no straws/small sips/bites/crush medication (when feasible)/position upright (90 degrees)

## 2018-08-31 NOTE — SWALLOW BEDSIDE ASSESSMENT ADULT - SWALLOW EVAL: DIAGNOSIS
Patient demonstrates symptoms consistent with Mild Oropharyngeal Dysphagia characterized by delayed bolus collection, manipulation and transfer of regular solid textures, with mild lingual residue on left side which cleared with subsequent swallow. Functional oral management noted for puree, mechanical soft and thin liquids. The pharyngeal stage is characterized by a timely pharyngeal trigger with suspect reduced hyolaryngeal excursion upon digital palpation. Strong cough noted after the swallow with regular solids, and after thin liquids consumed via consecutive cup drinking. Mechanical soft solids and thin liquids consumed via small, single cup sips were shown to eliminate cough. Vocal quality remained clear. No additional s/s of penetration/aspiration noted. Patient, her son and HHA were educated at length on diet recommendations and safe swallow strategies (i.e. small/single cup sips, slow rate, no straws) at which time good understanding verbalized.

## 2018-08-31 NOTE — PHYSICAL THERAPY INITIAL EVALUATION ADULT - ACTIVE RANGE OF MOTION EXAMINATION, REHAB EVAL
left UE / LE with active assistive ROM wfl/Right UE Active ROM was WFL (within functional limits)/Right LE Active ROM was WFL (within functional limits)

## 2018-08-31 NOTE — SWALLOW BEDSIDE ASSESSMENT ADULT - SWALLOW EVAL: CRITERIA FOR SKILLED INTERVENTION MET
demonstrates skilled criteria for swallowing intervention to assess for diet texture upgrade and instruct in oral motor exercises/demonstrates skilled criteria for swallowing intervention

## 2018-08-31 NOTE — PROGRESS NOTE ADULT - SUBJECTIVE AND OBJECTIVE BOX
INTERVAL HPI/OVERNIGHT EVENTS:    SUBJECTIVE: Patient seen and examined at bedside. No acute events over night.    CONSTITUTIONAL: No weakness, fevers or chills  EYES/ENT: No visual changes;  No vertigo or throat pain   NECK: No pain or stiffness  RESPIRATORY: No cough, wheezing, hemoptysis; No shortness of breath  CARDIOVASCULAR: No chest pain or palpitations  GASTROINTESTINAL: No abdominal or epigastric pain. No nausea, vomiting, or hematemesis; No diarrhea or constipation. No melena or hematochezia.  GENITOURINARY: No dysuria, frequency or hematuria  NEUROLOGICAL: No numbness or weakness  SKIN: No itching, rashes    OBJECTIVE:    VITAL SIGNS:  ICU Vital Signs Last 24 Hrs  T(C): 36.6 (31 Aug 2018 04:00), Max: 36.7 (30 Aug 2018 21:33)  T(F): 97.8 (31 Aug 2018 04:00), Max: 98 (30 Aug 2018 21:33)  HR: 59 (31 Aug 2018 06:00) (59 - 82)  BP: 103/57 (31 Aug 2018 06:00) (103/57 - 188/75)  BP(mean): 71 (31 Aug 2018 06:00) (71 - 123)  ABP: --  ABP(mean): --  RR: 12 (31 Aug 2018 06:00) (12 - 24)  SpO2: 95% (31 Aug 2018 06:00) (94% - 100%)        08-30 @ 07:01  -  08-31 @ 07:00  --------------------------------------------------------  IN: 0 mL / OUT: 300 mL / NET: -300 mL      CAPILLARY BLOOD GLUCOSE      POCT Blood Glucose.: 153 mg/dL (30 Aug 2018 19:43)      PHYSICAL EXAM:    General: NAD  HEENT: NC/AT; PERRL, clear conjunctiva  Neck: supple  Respiratory: CTA b/l  Cardiovascular: +S1/S2; RRR  Abdomen: soft, NT/ND; +BS x4  Extremities: WWP, 2+ peripheral pulses b/l; no LE edema  Skin: normal color and turgor; no rash  Neurological: Decreased sensation L side of face and UE/LE. 3/5 LUE/LLE strength    MEDICATIONS:  MEDICATIONS  (STANDING):  chlorhexidine 4% Liquid 1 Application(s) Topical <User Schedule>  diphtheria/tetanus/pertussis (acellular) Vaccine (ADAcel) 0.5 milliLiter(s) IntraMuscular once    MEDICATIONS  (PRN):      ALLERGIES:  Allergies    No Known Allergies    Intolerances        LABS:                        13.4   8.85  )-----------( 179      ( 31 Aug 2018 04:00 )             40.9     08-31    139  |  104  |  22  ----------------------------<  117<H>  4.3   |  20<L>  |  0.79    Ca    8.9      31 Aug 2018 04:00  Phos  2.3     08-31  Mg     2.3     08-31    TPro  7.0  /  Alb  3.9  /  TBili  0.5  /  DBili  x   /  AST  24  /  ALT  20  /  AlkPhos  61  08-31    PT/INR - ( 30 Aug 2018 20:11 )   PT: 12.9 SEC;   INR: 1.12          PTT - ( 30 Aug 2018 20:11 )  PTT:26.7 SEC      RADIOLOGY & ADDITIONAL TESTS: Reviewed.

## 2018-08-31 NOTE — PROGRESS NOTE ADULT - ASSESSMENT
87 y/o woman w/ afib ( on xarelto and metoprolol), HLD, anxiety presented to ED complaining of L sided weakness 2/2 to R sided CVA (R PCA occlusion on CTA) s/p tPa admitted to MICU for close neurological follow up.     #Neuro  - CTA Head showed R PCA occlusion; L vertebral artery stenosis s/p tPA  - q1h neuro checks. Plan for CTH 8pm this evening to r/o hemorraghic conversion to start ASA 81 daily and DVT ppx as per neurology   - Speech and swallow evaluation  - TTE to r/o thrombus/ embolic phenomena  - PT/OT    #Resp  - Currently O2 sat: > 95 on room air, no respiratory distress  - Continue monitoring respiratory status    #Cardio   - hx of afib (on xarelto/ metoprolol/ amiodarone); Rate/rhythm controlled at this time off Metoprolol EKG NSR.  - Goal BP: <180/105 post tPA, followed by gradual normotension    #GI  - NPO    #ID  - No active issues    #Metabolic  - No active issues    #Prophylaxis   -  SCD

## 2018-08-31 NOTE — SWALLOW BEDSIDE ASSESSMENT ADULT - ASR SWALLOW ASPIRATION MONITOR
change of breathing pattern/cough/fever/oral hygiene/position upright (90Y)/gurgly voice/throat clearing/upper respiratory infection/pneumonia

## 2018-08-31 NOTE — CHART NOTE - NSCHARTNOTEFT_GEN_A_CORE
MICU Transfer Note    Transfer from: MICU    Transfer to: ( x ) Medicine    (  ) Telemetry     (   ) RCU        (    ) Palliative         (   ) Stroke Unit          (   ) _________________    Accepting Physician:    Sign Out given to:     MICU COURSE:    88 year old female with atrial fibrillation (on Xarelto, metoprolol, amiodarone), hyperlipidemia, anxiety presents to ED after sudden onset L sided weakness witnessed by family while at dinner.  Patient slid out of chair and sunequently unable to get up. No shaking like activity or LOC. EMS was immediately called and code stroke called in ED. Initial neurological exam significant for mild-moderate facial droop with decreased weakness in left arm and leg, and decreased sensation over entire left side. No gaze deviation/preference or cortical signs. Code stroke called. NIHSS score 7 however initial CT head was negative for acute ischemic or hemorrhagic infarct. CTA revealed R. PCA branch occlusion. No contraindications to tPA. Risks/ benefits discussed with patient and t-PA given at 20:22. No other associated symptoms or complaints. Patient admitted to MICU for post tPA-protocol. Patient passed dysphagia screen, seen by PT who recommended RY. Neurological deficits remained stable. Repeat 24-hour CT head revealed acute ischemic in R. basal ganglia. Patient cleared for transfer to Medicine Floor for further management.       ASSESSMENT & PLAN:     88 year old female with atrial fibrillation (on Xarelto, metoprolol, amiodarone), hyperlipidemia, anxiety presents for acute onset left-sided weakness with left facial droop s/p code stroke and t-PA found to have acute ischemic infarct in R. basal ganglia with hemorrhagic conversion.      FOR FOLLOW UP: MICU Transfer Note    Transfer from: MICU    Transfer to: ( x ) Medicine    (  ) Telemetry     (   ) RCU        (    ) Palliative         (   ) Stroke Unit          (   ) _________________    Accepting Physician:    Sign Out given to:     MICU COURSE:    88 year old female with atrial fibrillation (on Xarelto, metoprolol, amiodarone), hyperlipidemia, anxiety presents to ED after sudden onset L sided weakness witnessed by family while at dinner.  Patient slid out of chair and subsequently unable to get up. No shaking like activity or LOC. EMS was immediately called and code stroke called in ED. Initial neurological exam significant for mild-moderate facial droop with decreased weakness in left arm and leg, and decreased sensation over entire left side. No gaze deviation/preference or cortical signs. Code stroke called. NIHSS score 7 however initial CT head was negative for acute ischemic or hemorrhagic infarct. CTA revealed R. PCA branch occlusion. No contraindications to tPA. Risks/ benefits discussed with patient and t-PA given at 20:22. No other associated symptoms or complaints. Patient admitted to MICU for post tPA-protocol. Patient passed formal speech/swallow screen, resumed on oral diet, seen by PT who recommended RY, TTE revealed normal systolic LV function however no Bubble study performed. Neurological deficits remained stable. Repeat 24-hour CT head revealed acute ischemic in R. basal ganglia. ASA and VTE prophylaxis started. Patient cleared for transfer to Medicine Floor for further management.       ASSESSMENT & PLAN:     88 year old female with atrial fibrillation (on Xarelto, metoprolol, amiodarone), hyperlipidemia, anxiety presents for acute onset left-sided weakness with left facial droop s/p code stroke and t-PA found to have acute ischemic infarct in R. basal ganglia with hemorrhagic conversion.      FOR FOLLOW UP:    [ ] f/u official read MRI brain non-con, MRA head & neck  [ ] f/u Neurology recommendations regarding resuming home NOAC  [ ] RY placement    Jessie Graham MD  PGY-3 | MICU Resident  Dept. Internal Medicine MICU Transfer Note    Transfer from: MICU    Transfer to: ( x ) Medicine    (  ) Telemetry     (   ) RCU        (    ) Palliative         (   ) Stroke Unit          (   ) _________________    Accepting Physician:    Sign Out given to:     MICU COURSE:    88 year old female with atrial fibrillation (on Xarelto, metoprolol, amiodarone), hyperlipidemia, anxiety presents to ED after sudden onset L sided weakness witnessed by family while at dinner.  Patient slid out of chair and subsequently unable to get up. No shaking like activity or LOC. EMS was immediately called and code stroke called in ED. Initial neurological exam significant for mild-moderate facial droop with decreased weakness in left arm and leg, and decreased sensation over entire left side. No gaze deviation/preference or cortical signs. Code stroke called. NIHSS score 7 however initial CT head was negative for acute ischemic or hemorrhagic infarct. CTA revealed R. PCA branch occlusion. No contraindications to tPA. Risks/ benefits discussed with patient and t-PA given at 20:22. No other associated symptoms or complaints. Patient admitted to MICU for post tPA-protocol. Patient passed formal speech/swallow screen, resumed on oral diet, seen by PT who recommended RY, TTE revealed normal systolic LV function however no Bubble study performed. Neurological deficits remained stable. Repeat 24-hour CT head revealed acute ischemic in R. basal ganglia. ASA and VTE prophylaxis started. Patient cleared for transfer to Medicine Floor for further management.       ASSESSMENT & PLAN:     88 year old female with atrial fibrillation (on Xarelto, metoprolol, amiodarone), hyperlipidemia, anxiety presents for acute onset left-sided weakness with left facial droop s/p code stroke and t-PA found to have acute ischemic infarct in R. basal ganglia without hemorrhagic conversion.      FOR FOLLOW UP:    [ ] f/u official read MRI brain non-con, MRA head & neck  [ ] f/u Neurology recommendations regarding resuming home NOAC  [ ] RY placement    Jessie Graham MD  PGY-3 | MICU Resident  Dept. Internal Medicine

## 2018-08-31 NOTE — SWALLOW BEDSIDE ASSESSMENT ADULT - COMMENTS
Pt is an 88 year old female with PMHx of afib (on xarelto and metoprolol), HLD, anxiety, who presented to ED complaining of L sided weakness 2/2 to R sided CVA (R PCA occlusion on CTA) s/p tPa, admitted to MICU for close neurological follow up, s/p transfer to CCU.    Patient was received alert, pleasant and cooperative this AM. Patient was able to follow simple one-step directives and communicate basic wants/needs upon informal assessment. Patient's son and HHA present at bedside. Primary RN notified of recommendations. +strong cough after consecutive cup sip drinking; cough eliminated with clinician cue for single cup sips Pt is an 88 year old female with PMHx of afib (on xarelto and metoprolol), HLD, anxiety, who presented to ED complaining of L sided weakness 2/2 to R sided CVA (R PCA occlusion on CTA) s/p tPa, admitted to MICU for close neurological follow up, s/p transfer to CCU.    Patient was received alert, pleasant and cooperative this AM. Patient was able to follow simple one-step directives and communicate basic wants/needs upon informal assessment. Patient's son and HHA present at bedside. Primary RN and Dr. Emery notified of recommendations.

## 2018-08-31 NOTE — SWALLOW BEDSIDE ASSESSMENT ADULT - ORAL PHASE
delayed AP transfer; mild lingual residue on left lingual surface which cleared with subsequent swallow Within functional limits

## 2018-08-31 NOTE — CHART NOTE - NSCHARTNOTEFT_GEN_A_CORE
Briefly, 88 F w/ afib on xarelto p/w acute onset L sided weakness concerning for CVA w/ CT imaging revealing R PCA infarct. Code stroke called in ED and pt given tpa and admitted to MICU. 24H repeat CTH w/ R basal ganglia infarct and no evidence of hemorrhagic conversion. Pt passed speech and swallow and now on mechanical soft diet. PT assessed pt and recommend rehab. Neurology following, MRI head pending read.     For Follow Up:    [] f/u MRI brain read  [] q4h neuro checks  [] f/u neuro regarding resuming noac  [] TTE w/ no mention of bubble study, consider repeat vs clarifying with echo tech  [] dispo planning to rehab per PT. f/u OT  [] started on asa and lipitor for secondary prevention    James Suero PGY3

## 2018-09-01 LAB
BUN SERPL-MCNC: 16 MG/DL — SIGNIFICANT CHANGE UP (ref 7–23)
CALCIUM SERPL-MCNC: 9.1 MG/DL — SIGNIFICANT CHANGE UP (ref 8.4–10.5)
CHLORIDE SERPL-SCNC: 105 MMOL/L — SIGNIFICANT CHANGE UP (ref 98–107)
CO2 SERPL-SCNC: 25 MMOL/L — SIGNIFICANT CHANGE UP (ref 22–31)
CREAT SERPL-MCNC: 0.86 MG/DL — SIGNIFICANT CHANGE UP (ref 0.5–1.3)
GLUCOSE SERPL-MCNC: 102 MG/DL — HIGH (ref 70–99)
HCT VFR BLD CALC: 41.1 % — SIGNIFICANT CHANGE UP (ref 34.5–45)
HGB BLD-MCNC: 13.3 G/DL — SIGNIFICANT CHANGE UP (ref 11.5–15.5)
MAGNESIUM SERPL-MCNC: 2.3 MG/DL — SIGNIFICANT CHANGE UP (ref 1.6–2.6)
MCHC RBC-ENTMCNC: 30.6 PG — SIGNIFICANT CHANGE UP (ref 27–34)
MCHC RBC-ENTMCNC: 32.4 % — SIGNIFICANT CHANGE UP (ref 32–36)
MCV RBC AUTO: 94.5 FL — SIGNIFICANT CHANGE UP (ref 80–100)
NRBC # FLD: 0 — SIGNIFICANT CHANGE UP
PHOSPHATE SERPL-MCNC: 4.2 MG/DL — SIGNIFICANT CHANGE UP (ref 2.5–4.5)
PLATELET # BLD AUTO: 170 K/UL — SIGNIFICANT CHANGE UP (ref 150–400)
PMV BLD: 11.6 FL — SIGNIFICANT CHANGE UP (ref 7–13)
POTASSIUM SERPL-MCNC: 4.3 MMOL/L — SIGNIFICANT CHANGE UP (ref 3.5–5.3)
POTASSIUM SERPL-SCNC: 4.3 MMOL/L — SIGNIFICANT CHANGE UP (ref 3.5–5.3)
RBC # BLD: 4.35 M/UL — SIGNIFICANT CHANGE UP (ref 3.8–5.2)
RBC # FLD: 14.6 % — HIGH (ref 10.3–14.5)
SODIUM SERPL-SCNC: 142 MMOL/L — SIGNIFICANT CHANGE UP (ref 135–145)
WBC # BLD: 6.39 K/UL — SIGNIFICANT CHANGE UP (ref 3.8–10.5)
WBC # FLD AUTO: 6.39 K/UL — SIGNIFICANT CHANGE UP (ref 3.8–10.5)

## 2018-09-01 PROCEDURE — 99233 SBSQ HOSP IP/OBS HIGH 50: CPT

## 2018-09-01 RX ORDER — MIRTAZAPINE 45 MG/1
15 TABLET, ORALLY DISINTEGRATING ORAL AT BEDTIME
Qty: 0 | Refills: 0 | Status: DISCONTINUED | OUTPATIENT
Start: 2018-09-01 | End: 2018-09-05

## 2018-09-01 RX ORDER — METOPROLOL TARTRATE 50 MG
25 TABLET ORAL
Qty: 0 | Refills: 0 | Status: DISCONTINUED | OUTPATIENT
Start: 2018-09-01 | End: 2018-09-05

## 2018-09-01 RX ORDER — LANOLIN ALCOHOL/MO/W.PET/CERES
6 CREAM (GRAM) TOPICAL ONCE
Qty: 0 | Refills: 0 | Status: COMPLETED | OUTPATIENT
Start: 2018-09-01 | End: 2018-09-01

## 2018-09-01 RX ORDER — LANOLIN ALCOHOL/MO/W.PET/CERES
3 CREAM (GRAM) TOPICAL AT BEDTIME
Qty: 0 | Refills: 0 | Status: DISCONTINUED | OUTPATIENT
Start: 2018-09-01 | End: 2018-09-05

## 2018-09-01 RX ADMIN — Medication 25 MILLIGRAM(S): at 05:45

## 2018-09-01 RX ADMIN — Medication 25 MILLIGRAM(S): at 17:29

## 2018-09-01 RX ADMIN — MIRTAZAPINE 15 MILLIGRAM(S): 45 TABLET, ORALLY DISINTEGRATING ORAL at 22:52

## 2018-09-01 RX ADMIN — Medication 6 MILLIGRAM(S): at 00:35

## 2018-09-01 RX ADMIN — Medication 10 MILLIGRAM(S): at 05:46

## 2018-09-01 RX ADMIN — ENOXAPARIN SODIUM 40 MILLIGRAM(S): 100 INJECTION SUBCUTANEOUS at 12:11

## 2018-09-01 RX ADMIN — Medication 3 MILLIGRAM(S): at 22:52

## 2018-09-01 RX ADMIN — Medication 81 MILLIGRAM(S): at 12:11

## 2018-09-01 RX ADMIN — ATORVASTATIN CALCIUM 80 MILLIGRAM(S): 80 TABLET, FILM COATED ORAL at 22:52

## 2018-09-01 NOTE — PROGRESS NOTE ADULT - SUBJECTIVE AND OBJECTIVE BOX
CHIEF COMPLAINT: Patient is a 88y old  female who presents with a chief complaint of L sided weakness (30 Aug 2018 22:09)      SUBJECTIVE / OVERNIGHT EVENTS:    Upset about L-sided weakness. Otherwise no complaints. LUE strength improved from yesterday. Denies abdominal pain.    MEDICATIONS  (STANDING):  aspirin enteric coated 81 milliGRAM(s) Oral daily  atorvastatin 80 milliGRAM(s) Oral at bedtime  diphtheria/tetanus/pertussis (acellular) Vaccine (ADAcel) 0.5 milliLiter(s) IntraMuscular once  enoxaparin Injectable 40 milliGRAM(s) SubCutaneous daily  melatonin 3 milliGRAM(s) Oral at bedtime  metoprolol tartrate 25 milliGRAM(s) Oral two times a day  mirtazapine 15 milliGRAM(s) Oral at bedtime  PARoxetine 10 milliGRAM(s) Oral daily    MEDICATIONS  (PRN):      VITALS:  T(F): 98.2 (18 @ 14:43), Max: 99.2 (18 @ 16:30)  HR: 80 (18 @ 14:43) (80 - 130)  BP: 118/71 (18 @ 14:43) (93/61 - 155/74)  RR: 18 (18 @ 14:43) (13 - 23)  SpO2: 100% (18 @ 14:43)      CAPILLARY BLOOD GLUCOSE    Output     I&O's Summary  T(F): 98.2 (18 @ 14:43), Max: 99.2 (18 @ 16:30)  HR: 80 (18 @ 14:43) (80 - 130)  BP: 118/71 (18 @ 14:43) (93/61 - 155/74)  RR: 18 (18 @ 14:43) (13 - 23)  SpO2: 100% (18 @ 14:43)    PHYSICAL EXAM:  GENERAL: NAD, well-developed  HEAD:  Atraumatic, Normocephalic  EYES: EOMI  NECK: Supple, No JVD  CHEST/LUNG: nonlabored breathing  HEART: nl S1/S2  ABDOMEN: nondistended, soft  EXTREMITIES:  no LE edema  PSYCH: alert, answering questions appropriately  NEUROLOGY: L-sided weakness  SKIN: No rashes noted    LABS:              13.3                 142  | 25   | 16           6.39  >-----------< 170     ------------------------< 102                   41.1                 4.3  | 105  | 0.86                                         Ca 9.1   Mg 2.3   Ph 4.2         TPro  7.0  /  Alb  3.9      TBili  0.5  /  DBili  x         AST  24  /  ALT  20            AlkPhos  61      INR: 1.12 ;    PT: 12.9 SEC;    PTT: 26.7 SEC<L>    CARDIAC MARKERS  x     / 47 u/L / 1.81 ng/mL          VB-30 @ 20:11  7.40/42/47/25/82.7/1.6        MICROBIOLOGY:        RADIOLOGY & ADDITIONAL TESTS:    Imaging Personally Reviewed:    < from: MR Head No Cont (18 @ 21:38) >    IMPRESSION:  Acute right thalamocapsular infarction.  Mild microvascular ischemic change.    < end of copied text >      [x] Consultant(s) Notes Reviewed: MICU, neuro    [] Care Discussed with Consultants/Other Providers:

## 2018-09-01 NOTE — PROGRESS NOTE ADULT - ASSESSMENT
89 y/o woman w/ afib ( on xarelto and metoprolol), HLD, anxiety presented to ED complaining of L sided weakness 2/2 to R sided CVA (R PCA occlusion on CTA) s/p tPa admitted to MICU for close neurological follow up.     *L-sided weakness: MRI with Acute right thalamocapsular infarction  - CTA Head showed R PCA occlusion; L vertebral artery stenosis s/p tPA  - start ASA 81 daily and DVT ppx as per neurology   - Speech and swallow evaluation  - TTE to r/o thrombus/ embolic phenomena  - PT/OT      *CV:  - hx of afib (on xarelto/ metoprolol/ amiodarone); Rate/rhythm controlled at this time off Metoprolol EKG NSR.  - Goal BP: <180/105 post tPA, followed by gradual normotension    *HLD: cont statin    *Anxiety: cont paroxetine, mirtazepine    *Dementia: if no objection from neuro, resume memantine, donepezil

## 2018-09-02 LAB
BUN SERPL-MCNC: 21 MG/DL — SIGNIFICANT CHANGE UP (ref 7–23)
CALCIUM SERPL-MCNC: 8.9 MG/DL — SIGNIFICANT CHANGE UP (ref 8.4–10.5)
CHLORIDE SERPL-SCNC: 104 MMOL/L — SIGNIFICANT CHANGE UP (ref 98–107)
CO2 SERPL-SCNC: 24 MMOL/L — SIGNIFICANT CHANGE UP (ref 22–31)
CREAT SERPL-MCNC: 0.88 MG/DL — SIGNIFICANT CHANGE UP (ref 0.5–1.3)
GLUCOSE SERPL-MCNC: 113 MG/DL — HIGH (ref 70–99)
HCT VFR BLD CALC: 43.6 % — SIGNIFICANT CHANGE UP (ref 34.5–45)
HGB BLD-MCNC: 14 G/DL — SIGNIFICANT CHANGE UP (ref 11.5–15.5)
MAGNESIUM SERPL-MCNC: 2.2 MG/DL — SIGNIFICANT CHANGE UP (ref 1.6–2.6)
MCHC RBC-ENTMCNC: 30 PG — SIGNIFICANT CHANGE UP (ref 27–34)
MCHC RBC-ENTMCNC: 32.1 % — SIGNIFICANT CHANGE UP (ref 32–36)
MCV RBC AUTO: 93.4 FL — SIGNIFICANT CHANGE UP (ref 80–100)
NRBC # FLD: 0 — SIGNIFICANT CHANGE UP
PLATELET # BLD AUTO: 171 K/UL — SIGNIFICANT CHANGE UP (ref 150–400)
PMV BLD: 11.2 FL — SIGNIFICANT CHANGE UP (ref 7–13)
POTASSIUM SERPL-MCNC: 4 MMOL/L — SIGNIFICANT CHANGE UP (ref 3.5–5.3)
POTASSIUM SERPL-SCNC: 4 MMOL/L — SIGNIFICANT CHANGE UP (ref 3.5–5.3)
RBC # BLD: 4.67 M/UL — SIGNIFICANT CHANGE UP (ref 3.8–5.2)
RBC # FLD: 14.7 % — HIGH (ref 10.3–14.5)
SODIUM SERPL-SCNC: 142 MMOL/L — SIGNIFICANT CHANGE UP (ref 135–145)
WBC # BLD: 5.54 K/UL — SIGNIFICANT CHANGE UP (ref 3.8–10.5)
WBC # FLD AUTO: 5.54 K/UL — SIGNIFICANT CHANGE UP (ref 3.8–10.5)

## 2018-09-02 PROCEDURE — 70450 CT HEAD/BRAIN W/O DYE: CPT | Mod: 26

## 2018-09-02 PROCEDURE — 99233 SBSQ HOSP IP/OBS HIGH 50: CPT

## 2018-09-02 RX ORDER — SODIUM CHLORIDE 9 MG/ML
500 INJECTION INTRAMUSCULAR; INTRAVENOUS; SUBCUTANEOUS ONCE
Qty: 0 | Refills: 0 | Status: COMPLETED | OUTPATIENT
Start: 2018-09-02 | End: 2018-09-02

## 2018-09-02 RX ORDER — SENNA PLUS 8.6 MG/1
2 TABLET ORAL AT BEDTIME
Qty: 0 | Refills: 0 | Status: DISCONTINUED | OUTPATIENT
Start: 2018-09-02 | End: 2018-09-05

## 2018-09-02 RX ORDER — POLYETHYLENE GLYCOL 3350 17 G/17G
17 POWDER, FOR SOLUTION ORAL DAILY
Qty: 0 | Refills: 0 | Status: DISCONTINUED | OUTPATIENT
Start: 2018-09-02 | End: 2018-09-05

## 2018-09-02 RX ORDER — ACETAMINOPHEN 500 MG
650 TABLET ORAL EVERY 6 HOURS
Qty: 0 | Refills: 0 | Status: DISCONTINUED | OUTPATIENT
Start: 2018-09-02 | End: 2018-09-05

## 2018-09-02 RX ADMIN — Medication 81 MILLIGRAM(S): at 13:03

## 2018-09-02 RX ADMIN — Medication 25 MILLIGRAM(S): at 06:28

## 2018-09-02 RX ADMIN — ATORVASTATIN CALCIUM 80 MILLIGRAM(S): 80 TABLET, FILM COATED ORAL at 21:08

## 2018-09-02 RX ADMIN — SODIUM CHLORIDE 500 MILLILITER(S): 9 INJECTION INTRAMUSCULAR; INTRAVENOUS; SUBCUTANEOUS at 16:54

## 2018-09-02 RX ADMIN — ENOXAPARIN SODIUM 40 MILLIGRAM(S): 100 INJECTION SUBCUTANEOUS at 13:03

## 2018-09-02 RX ADMIN — Medication 3 MILLIGRAM(S): at 21:08

## 2018-09-02 RX ADMIN — MIRTAZAPINE 15 MILLIGRAM(S): 45 TABLET, ORALLY DISINTEGRATING ORAL at 21:08

## 2018-09-02 RX ADMIN — Medication 25 MILLIGRAM(S): at 20:23

## 2018-09-02 RX ADMIN — SENNA PLUS 2 TABLET(S): 8.6 TABLET ORAL at 21:08

## 2018-09-02 RX ADMIN — Medication 10 MILLIGRAM(S): at 13:03

## 2018-09-02 NOTE — OCCUPATIONAL THERAPY INITIAL EVALUATION ADULT - LIVES WITH, PROFILE
Pt. reports she lives alone in an apartment building ([facility]) with no steps to enter. Once inside, pt. reports she has an elevator to reach her apartment located on the 31st floor. Per pt., she has a shower stall in her bathroom with grab bar available.

## 2018-09-02 NOTE — OCCUPATIONAL THERAPY INITIAL EVALUATION ADULT - PLANNED THERAPY INTERVENTIONS, OT EVAL
bed mobility training/ROM/strengthening/fine motor coordination training/transfer training/balance training/neuromuscular re-education/ADL retraining

## 2018-09-02 NOTE — PROGRESS NOTE ADULT - SUBJECTIVE AND OBJECTIVE BOX
CHIEF COMPLAINT: Patient is a 88y old  female who presents with a chief complaint of L sided weakness (30 Aug 2018 22:09)      SUBJECTIVE / OVERNIGHT EVENTS:    Seen with son at bedside. No complaints - L-sided weakness improving slightly. Still no BM. No other complaints.    MEDICATIONS  (STANDING):  aspirin enteric coated 81 milliGRAM(s) Oral daily  atorvastatin 80 milliGRAM(s) Oral at bedtime  diphtheria/tetanus/pertussis (acellular) Vaccine (ADAcel) 0.5 milliLiter(s) IntraMuscular once  enoxaparin Injectable 40 milliGRAM(s) SubCutaneous daily  melatonin 3 milliGRAM(s) Oral at bedtime  metoprolol tartrate 25 milliGRAM(s) Oral two times a day  mirtazapine 15 milliGRAM(s) Oral at bedtime  PARoxetine 10 milliGRAM(s) Oral daily  polyethylene glycol 3350 17 Gram(s) Oral daily  senna 2 Tablet(s) Oral at bedtime  sodium chloride 0.9% Bolus 500 milliLiter(s) IV Bolus once    MEDICATIONS  (PRN):      VITALS:  T(F): 97.6 (09-02-18 @ 13:54), Max: 98.5 (09-01-18 @ 21:35)  HR: 84 (09-02-18 @ 15:21) (72 - 98)  BP: 95/52 (09-02-18 @ 15:21) (88/59 - 151/90)  RR: 18 (09-02-18 @ 13:54) (18 - 18)  SpO2: 100% (09-02-18 @ 13:54)      CAPILLARY BLOOD GLUCOSE    Output     I&O's Summary  T(F): 97.6 (09-02-18 @ 13:54), Max: 98.5 (09-01-18 @ 21:35)  HR: 84 (09-02-18 @ 15:21) (72 - 98)  BP: 95/52 (09-02-18 @ 15:21) (88/59 - 151/90)  RR: 18 (09-02-18 @ 13:54) (18 - 18)  SpO2: 100% (09-02-18 @ 13:54)    PHYSICAL EXAM:  GENERAL: NAD, well-developed  HEAD:  Atraumatic, Normocephalic  EYES: EOMI  NECK: Supple, No JVD  CHEST/LUNG: nonlabored breathing  HEART: nl S1/S2  ABDOMEN: nondistended, soft  EXTREMITIES:  no LE edema  PSYCH: alert, answering questions appropriately  SKIN: No rashes noted    LABS:              14.0                 142  | 24   | 21           5.54  >-----------< 171     ------------------------< 113                   43.6                 4.0  | 104  | 0.88                                         Ca 8.9   Mg 2.2   Ph x                          MICROBIOLOGY:        RADIOLOGY & ADDITIONAL TESTS:    Imaging Personally Reviewed:    < from: MR Head No Cont (08.31.18 @ 21:38) >    IMPRESSION:  Acute right thalamocapsular infarction.  Mild microvascular ischemic change.      < end of copied text >      [x] Consultant(s) Notes Reviewed: neuro, OT    [] Care Discussed with Consultants/Other Providers:

## 2018-09-02 NOTE — OCCUPATIONAL THERAPY INITIAL EVALUATION ADULT - MD ORDER
Occupational Therapy (OT) to evaluate and treat. Occupational Therapy (OT) to evaluate and treat. Per KING Mittal, pt is okay to participate in OT evaluation and perform activity as tolerated.

## 2018-09-02 NOTE — OCCUPATIONAL THERAPY INITIAL EVALUATION ADULT - GENERAL OBSERVATIONS, REHAB EVAL
Pt. received sitting in chair next to bed. No acute distress. Patient agreed to evaluation from Occupational Therapist. +Lamar. Cousin present bedside.

## 2018-09-02 NOTE — OCCUPATIONAL THERAPY INITIAL EVALUATION ADULT - LEVEL OF INDEPENDENCE: SIT/STAND, REHAB EVAL
Initiated with Max assistance; however, pt unable to come to full upright standing position at this time.

## 2018-09-02 NOTE — OCCUPATIONAL THERAPY INITIAL EVALUATION ADULT - PERTINENT HX OF CURRENT PROBLEM, REHAB EVAL
Pt is a 88 year old female with hx of AFib, HLD, and anxiety, who presented to [facility] on 8/30/18 with c/o Left sided weakness. Pt is s/p tPA. MR Head No Contrast on 8/31/18 displayed acute right thalamocapsular infarction. Mild microvascular ischemic change.

## 2018-09-02 NOTE — PROGRESS NOTE ADULT - ASSESSMENT
87 y/o woman w/ afib ( on xarelto and metoprolol), HLD, anxiety presented to ED complaining of L sided weakness 2/2 to R sided CVA (R PCA occlusion on CTA) s/p tPa admitted to MICU for close neurological follow up.     *L-sided weakness: MRI with Acute right thalamocapsular infarction  - CTA Head showed R PCA occlusion; L vertebral artery stenosis s/p tPA  - start ASA 81 daily and DVT ppx as per neurology   - Speech and swallow evaluation  - TTE to r/o thrombus/ embolic phenomena  - per neuro, repeat head CT prior to resuming therapeutic AC (for h/o A fib)  - PT/OT - rehab placement  - son requesting to speak to       *CV:  - hx of afib (on xarelto/ metoprolol/ amiodarone); Rate/rhythm controlled at this time off Metoprolol EKG NSR.  - Goal BP: <180/105 post tPA, followed by gradual normotension    *HLD: cont statin    *Anxiety: cont paroxetine, mirtazepine    *Dementia: if no objection from neuro, resume memantine, donepezil    *Constipation: Senna, miralax 89 y/o woman w/ afib ( on xarelto and metoprolol), HLD, anxiety presented to ED complaining of L sided weakness 2/2 to R sided CVA (R PCA occlusion on CTA) s/p tPa admitted to MICU for close neurological follow up.     *L-sided weakness: MRI with Acute right thalamocapsular infarction  - CTA Head showed R PCA occlusion; L vertebral artery stenosis s/p tPA  - start ASA 81 daily and DVT ppx as per neurology   - Speech and swallow evaluation  - TTE to r/o thrombus/ embolic phenomena  - per neuro, repeat head CT prior to resuming therapeutic AC (for h/o A fib)  - PT/OT - rehab placement  - son requesting to speak to       *CV:  - hx of afib (on xarelto/ metoprolol/ amiodarone); Rate/rhythm controlled at this time off Metoprolol EKG NSR.  - Goal BP: <180/105 post tPA, followed by gradual normotension    *HLD: cont statin    *Anxiety: cont paroxetine, mirtazepine    *Dementia: if no objection from neuro, resume memantine, donepezil    *Constipation: Senna, miralax    *Hypotension: etiology unclear, patient asymptomatic. Normal EF on TTE  -  cc bolus  - monitor BP

## 2018-09-02 NOTE — OCCUPATIONAL THERAPY INITIAL EVALUATION ADULT - DIAGNOSIS, OT EVAL
r/o CVA; Left UE weakness; Decreased sensation to Left UE; Decreased functional mobility; Decreased ADL management

## 2018-09-03 LAB
BUN SERPL-MCNC: 21 MG/DL — SIGNIFICANT CHANGE UP (ref 7–23)
CALCIUM SERPL-MCNC: 8.8 MG/DL — SIGNIFICANT CHANGE UP (ref 8.4–10.5)
CHLORIDE SERPL-SCNC: 105 MMOL/L — SIGNIFICANT CHANGE UP (ref 98–107)
CO2 SERPL-SCNC: 23 MMOL/L — SIGNIFICANT CHANGE UP (ref 22–31)
CREAT SERPL-MCNC: 0.77 MG/DL — SIGNIFICANT CHANGE UP (ref 0.5–1.3)
GLUCOSE SERPL-MCNC: 159 MG/DL — HIGH (ref 70–99)
HCT VFR BLD CALC: 40.4 % — SIGNIFICANT CHANGE UP (ref 34.5–45)
HGB BLD-MCNC: 13.2 G/DL — SIGNIFICANT CHANGE UP (ref 11.5–15.5)
MAGNESIUM SERPL-MCNC: 2 MG/DL — SIGNIFICANT CHANGE UP (ref 1.6–2.6)
MCHC RBC-ENTMCNC: 30.8 PG — SIGNIFICANT CHANGE UP (ref 27–34)
MCHC RBC-ENTMCNC: 32.7 % — SIGNIFICANT CHANGE UP (ref 32–36)
MCV RBC AUTO: 94.4 FL — SIGNIFICANT CHANGE UP (ref 80–100)
NRBC # FLD: 0 — SIGNIFICANT CHANGE UP
PLATELET # BLD AUTO: 170 K/UL — SIGNIFICANT CHANGE UP (ref 150–400)
PMV BLD: 11.5 FL — SIGNIFICANT CHANGE UP (ref 7–13)
POTASSIUM SERPL-MCNC: 4 MMOL/L — SIGNIFICANT CHANGE UP (ref 3.5–5.3)
POTASSIUM SERPL-SCNC: 4 MMOL/L — SIGNIFICANT CHANGE UP (ref 3.5–5.3)
RBC # BLD: 4.28 M/UL — SIGNIFICANT CHANGE UP (ref 3.8–5.2)
RBC # FLD: 14.6 % — HIGH (ref 10.3–14.5)
SODIUM SERPL-SCNC: 141 MMOL/L — SIGNIFICANT CHANGE UP (ref 135–145)
WBC # BLD: 5.62 K/UL — SIGNIFICANT CHANGE UP (ref 3.8–10.5)
WBC # FLD AUTO: 5.62 K/UL — SIGNIFICANT CHANGE UP (ref 3.8–10.5)

## 2018-09-03 PROCEDURE — 99233 SBSQ HOSP IP/OBS HIGH 50: CPT

## 2018-09-03 RX ORDER — NYSTATIN CREAM 100000 [USP'U]/G
1 CREAM TOPICAL
Qty: 0 | Refills: 0 | Status: DISCONTINUED | OUTPATIENT
Start: 2018-09-03 | End: 2018-09-05

## 2018-09-03 RX ADMIN — SENNA PLUS 2 TABLET(S): 8.6 TABLET ORAL at 21:28

## 2018-09-03 RX ADMIN — ENOXAPARIN SODIUM 40 MILLIGRAM(S): 100 INJECTION SUBCUTANEOUS at 11:43

## 2018-09-03 RX ADMIN — POLYETHYLENE GLYCOL 3350 17 GRAM(S): 17 POWDER, FOR SOLUTION ORAL at 06:38

## 2018-09-03 RX ADMIN — MIRTAZAPINE 15 MILLIGRAM(S): 45 TABLET, ORALLY DISINTEGRATING ORAL at 21:28

## 2018-09-03 RX ADMIN — Medication 3 MILLIGRAM(S): at 22:51

## 2018-09-03 RX ADMIN — Medication 10 MILLIGRAM(S): at 11:43

## 2018-09-03 RX ADMIN — NYSTATIN CREAM 1 APPLICATION(S): 100000 CREAM TOPICAL at 19:01

## 2018-09-03 RX ADMIN — ATORVASTATIN CALCIUM 80 MILLIGRAM(S): 80 TABLET, FILM COATED ORAL at 21:28

## 2018-09-03 RX ADMIN — Medication 81 MILLIGRAM(S): at 11:43

## 2018-09-03 RX ADMIN — Medication 25 MILLIGRAM(S): at 05:53

## 2018-09-03 RX ADMIN — Medication 25 MILLIGRAM(S): at 19:00

## 2018-09-03 NOTE — PROGRESS NOTE ADULT - ASSESSMENT
89 y/o woman w/ afib ( on xarelto and metoprolol), HLD, anxiety presented to ED complaining of L sided weakness 2/2 to R sided CVA (R PCA occlusion on CTA) s/p tPa admitted to MICU for close neurological follow up.     *L-sided weakness: MRI with Acute right thalamocapsular infarction  - CTA Head showed R PCA occlusion; L vertebral artery stenosis s/p tPA  - start ASA 81 daily and DVT ppx as per neurology   - Speech and swallow evaluation  - TTE to r/o thrombus/ embolic phenomena  - repeat head CT without hemorrhagic conversion  - f/u neuro recs regarding restarting Xarelto  - PT/OT - rehab placement  - son requesting to speak to CM    *CV:  - hx of afib (on xarelto/ metoprolol/ amiodarone); Rate/rhythm controlled at this time off Metoprolol EKG NSR.  - Goal BP: <180/105 post tPA, followed by gradual normotension  - repeat CT head without hemorrhagic conversion  - resume Xarelto if neuro in agreement  - BP at goal    *HLD: cont statin    *Anxiety: cont paroxetine, mirtazepine    *Dementia: if no objection from neuro, resume memantine, donepezil    *Constipation: Senna, miralax    *Hypotension: etiology unclear, patient asymptomatic. Normal EF on TTE  -  cc bolus  - monitor BP    *Dispo: needs rehab per PT, medically cleared for discharge if neuro in agreement

## 2018-09-03 NOTE — PROGRESS NOTE ADULT - SUBJECTIVE AND OBJECTIVE BOX
CHIEF COMPLAINT: Patient is a 88y old  female who presents with a chief complaint of L sided weakness (30 Aug 2018 22:09)      SUBJECTIVE / OVERNIGHT EVENTS:    L strength continues to improve. Son at bedside. Would like to speak to SW regarding rehab placement. Patient with no other complaints.    MEDICATIONS  (STANDING):  aspirin enteric coated 81 milliGRAM(s) Oral daily  atorvastatin 80 milliGRAM(s) Oral at bedtime  diphtheria/tetanus/pertussis (acellular) Vaccine (ADAcel) 0.5 milliLiter(s) IntraMuscular once  enoxaparin Injectable 40 milliGRAM(s) SubCutaneous daily  melatonin 3 milliGRAM(s) Oral at bedtime  metoprolol tartrate 25 milliGRAM(s) Oral two times a day  mirtazapine 15 milliGRAM(s) Oral at bedtime  nystatin Powder 1 Application(s) Topical two times a day  PARoxetine 10 milliGRAM(s) Oral daily  polyethylene glycol 3350 17 Gram(s) Oral daily  senna 2 Tablet(s) Oral at bedtime    MEDICATIONS  (PRN):  acetaminophen   Tablet. 650 milliGRAM(s) Oral every 6 hours PRN mild, moderate, severe pain      VITALS:  T(F): 98 (09-03-18 @ 12:51), Max: 98.1 (09-03-18 @ 05:50)  HR: 82 (09-03-18 @ 12:51) (79 - 93)  BP: 139/77 (09-03-18 @ 12:51) (95/52 - 148/82)  RR: 16 (09-03-18 @ 12:51) (16 - 18)  SpO2: 95% (09-03-18 @ 12:51)      CAPILLARY BLOOD GLUCOSE    Output     I&O's Summary  T(F): 98 (09-03-18 @ 12:51), Max: 98.1 (09-03-18 @ 05:50)  HR: 82 (09-03-18 @ 12:51) (79 - 93)  BP: 139/77 (09-03-18 @ 12:51) (95/52 - 148/82)  RR: 16 (09-03-18 @ 12:51) (16 - 18)  SpO2: 95% (09-03-18 @ 12:51)    PHYSICAL EXAM:  GENERAL: NAD, well-developed  HEAD:  Atraumatic, Normocephalic  EYES: EOMI  NECK: Supple, No JVD  CHEST/LUNG: nonlabored breathing  HEART: nl S1/S2  ABDOMEN: nondistended, soft  EXTREMITIES:  no LE edema  PSYCH: alert, answering questions appropriately  SKIN: No rashes noted    LABS:              x                    141  | 23   | 21           x     >-----------< x       ------------------------< 159                   x                    4.0  | 105  | 0.77                                         Ca 8.8   Mg 2.0   Ph x                          MICROBIOLOGY:        RADIOLOGY & ADDITIONAL TESTS:    Imaging Personally Reviewed:    < from: CT Head No Cont (09.02.18 @ 17:03) >    IMPRESSION:  No evidence for hemorrhagic transformation of right thalamocapsular   infarction.  No other interval change.  Microvascular ischemic change.            < end of copied text >      [x] Consultant(s) Notes Reviewed: neuro    [] Care Discussed with Consultants/Other Providers:

## 2018-09-04 LAB
BUN SERPL-MCNC: 15 MG/DL — SIGNIFICANT CHANGE UP (ref 7–23)
CALCIUM SERPL-MCNC: 9 MG/DL — SIGNIFICANT CHANGE UP (ref 8.4–10.5)
CHLORIDE SERPL-SCNC: 103 MMOL/L — SIGNIFICANT CHANGE UP (ref 98–107)
CO2 SERPL-SCNC: 24 MMOL/L — SIGNIFICANT CHANGE UP (ref 22–31)
CREAT SERPL-MCNC: 0.75 MG/DL — SIGNIFICANT CHANGE UP (ref 0.5–1.3)
GLUCOSE SERPL-MCNC: 101 MG/DL — HIGH (ref 70–99)
HCT VFR BLD CALC: 41.5 % — SIGNIFICANT CHANGE UP (ref 34.5–45)
HGB BLD-MCNC: 13.9 G/DL — SIGNIFICANT CHANGE UP (ref 11.5–15.5)
MAGNESIUM SERPL-MCNC: 2.2 MG/DL — SIGNIFICANT CHANGE UP (ref 1.6–2.6)
MCHC RBC-ENTMCNC: 31.4 PG — SIGNIFICANT CHANGE UP (ref 27–34)
MCHC RBC-ENTMCNC: 33.5 % — SIGNIFICANT CHANGE UP (ref 32–36)
MCV RBC AUTO: 93.7 FL — SIGNIFICANT CHANGE UP (ref 80–100)
NRBC # FLD: 0 — SIGNIFICANT CHANGE UP
PLATELET # BLD AUTO: 170 K/UL — SIGNIFICANT CHANGE UP (ref 150–400)
PMV BLD: 11.2 FL — SIGNIFICANT CHANGE UP (ref 7–13)
POTASSIUM SERPL-MCNC: 4.4 MMOL/L — SIGNIFICANT CHANGE UP (ref 3.5–5.3)
POTASSIUM SERPL-SCNC: 4.4 MMOL/L — SIGNIFICANT CHANGE UP (ref 3.5–5.3)
RBC # BLD: 4.43 M/UL — SIGNIFICANT CHANGE UP (ref 3.8–5.2)
RBC # FLD: 14.4 % — SIGNIFICANT CHANGE UP (ref 10.3–14.5)
SODIUM SERPL-SCNC: 137 MMOL/L — SIGNIFICANT CHANGE UP (ref 135–145)
WBC # BLD: 5.73 K/UL — SIGNIFICANT CHANGE UP (ref 3.8–10.5)
WBC # FLD AUTO: 5.73 K/UL — SIGNIFICANT CHANGE UP (ref 3.8–10.5)

## 2018-09-04 PROCEDURE — 99233 SBSQ HOSP IP/OBS HIGH 50: CPT

## 2018-09-04 PROCEDURE — 99222 1ST HOSP IP/OBS MODERATE 55: CPT | Mod: GC

## 2018-09-04 RX ORDER — RIVAROXABAN 15 MG-20MG
15 KIT ORAL EVERY 24 HOURS
Qty: 0 | Refills: 0 | Status: DISCONTINUED | OUTPATIENT
Start: 2018-09-04 | End: 2018-09-04

## 2018-09-04 RX ORDER — RIVAROXABAN 15 MG-20MG
20 KIT ORAL EVERY 24 HOURS
Qty: 0 | Refills: 0 | Status: DISCONTINUED | OUTPATIENT
Start: 2018-09-04 | End: 2018-09-04

## 2018-09-04 RX ORDER — RIVAROXABAN 15 MG-20MG
15 KIT ORAL EVERY 24 HOURS
Qty: 0 | Refills: 0 | Status: DISCONTINUED | OUTPATIENT
Start: 2018-09-05 | End: 2018-09-05

## 2018-09-04 RX ADMIN — ENOXAPARIN SODIUM 40 MILLIGRAM(S): 100 INJECTION SUBCUTANEOUS at 12:00

## 2018-09-04 RX ADMIN — Medication 25 MILLIGRAM(S): at 06:45

## 2018-09-04 RX ADMIN — POLYETHYLENE GLYCOL 3350 17 GRAM(S): 17 POWDER, FOR SOLUTION ORAL at 12:00

## 2018-09-04 RX ADMIN — ATORVASTATIN CALCIUM 80 MILLIGRAM(S): 80 TABLET, FILM COATED ORAL at 21:56

## 2018-09-04 RX ADMIN — Medication 25 MILLIGRAM(S): at 17:55

## 2018-09-04 RX ADMIN — NYSTATIN CREAM 1 APPLICATION(S): 100000 CREAM TOPICAL at 06:45

## 2018-09-04 RX ADMIN — Medication 10 MILLIGRAM(S): at 12:00

## 2018-09-04 RX ADMIN — Medication 3 MILLIGRAM(S): at 21:56

## 2018-09-04 RX ADMIN — SENNA PLUS 2 TABLET(S): 8.6 TABLET ORAL at 21:56

## 2018-09-04 RX ADMIN — Medication 81 MILLIGRAM(S): at 12:00

## 2018-09-04 RX ADMIN — NYSTATIN CREAM 1 APPLICATION(S): 100000 CREAM TOPICAL at 17:55

## 2018-09-04 RX ADMIN — MIRTAZAPINE 15 MILLIGRAM(S): 45 TABLET, ORALLY DISINTEGRATING ORAL at 21:56

## 2018-09-04 NOTE — PROGRESS NOTE ADULT - SUBJECTIVE AND OBJECTIVE BOX
Patient is a 88y old  Female who presents with a chief complaint of L sided weakness (30 Aug 2018 22:09)      SUBJECTIVE / OVERNIGHT EVENTS: pt in NAD no acute events ON. Tele-Afib in 70's    MEDICATIONS  (STANDING):  aspirin enteric coated 81 milliGRAM(s) Oral daily  atorvastatin 80 milliGRAM(s) Oral at bedtime  diphtheria/tetanus/pertussis (acellular) Vaccine (ADAcel) 0.5 milliLiter(s) IntraMuscular once  enoxaparin Injectable 40 milliGRAM(s) SubCutaneous daily  melatonin 3 milliGRAM(s) Oral at bedtime  metoprolol tartrate 25 milliGRAM(s) Oral two times a day  mirtazapine 15 milliGRAM(s) Oral at bedtime  nystatin Powder 1 Application(s) Topical two times a day  PARoxetine 10 milliGRAM(s) Oral daily  polyethylene glycol 3350 17 Gram(s) Oral daily  senna 2 Tablet(s) Oral at bedtime    MEDICATIONS  (PRN):  acetaminophen   Tablet. 650 milliGRAM(s) Oral every 6 hours PRN mild, moderate, severe pain        CAPILLARY BLOOD GLUCOSE        I&O's Summary      T(C): 36.8 (09-04-18 @ 06:43), Max: 36.8 (09-04-18 @ 06:43)  HR: 60 (09-04-18 @ 06:43) (60 - 73)  BP: 138/81 (09-04-18 @ 06:43) (112/60 - 138/81)  RR: 18 (09-04-18 @ 06:43) (17 - 18)  SpO2: 95% (09-04-18 @ 06:43) (95% - 96%)    PHYSICAL EXAM:  GENERAL: NAD, well-developed  HEAD:  Atraumatic, Normocephalic  EYES: EOMI, PERRLA, conjunctiva and sclera clear  NECK: Supple, No JVD  CHEST/LUNG: Clear to auscultation bilaterally; No wheeze  HEART: Regular rate and rhythm; No murmurs, rubs, or gallops  ABDOMEN: Soft, Nontender, Nondistended; Bowel sounds present  EXTREMITIES:  2+ Peripheral Pulses, No clubbing, cyanosis, or edema  PSYCH: AAOx3  NEUROLOGY:+Lt facial droop +LT hemiparesis UE 3/5 LE 2/5  SKIN: No rashes or lesions    LABS:                        13.9   5.73  )-----------( 170      ( 04 Sep 2018 07:55 )             41.5     09-04    137  |  103  |  15  ----------------------------<  101<H>  4.4   |  24  |  0.75    Ca    9.0      04 Sep 2018 07:55  Mg     2.2     09-04                  RADIOLOGY & ADDITIONAL TESTS:    Imaging Personally Reviewed:< from: MR Head No Cont (08.31.18 @ 21:38) >  EXAM:  MR BRAIN        PROCEDURE DATE:  Aug 31 2018         INTERPRETATION:  History: Evaluate for ischemic or hemorrhagic infarct   s/p t-PA with R. PCA branch occlusion      Technique: MRI of the brain was performed without contrast.    Sagittal and axial T1, axial T2, FLAIR, SWI, diffusion-weighted images   and an ADC map were obtained.    COMPARISON: CT head dated 8/31/2018.    FINDINGS:  Diffusion restriction along the right thalamocapsular junction is   consistent with an acute infarction. There is no susceptibility, or   hyperintense T1 signal to suggest hemorrhagic transformation.    The ventricles and sulci are prominent, consistent with age-appropriate   volume loss. Hemispheric white matter hyperintense T2/FLAIR signal   laterality is are consistent with mild microvascular ischemic change. Few   punctate foci of susceptibility within the brain may represent sequelae   from prior infectious versus granulomatous disease versus   microhemorrhages.     There is no intraparenchymal hematoma, mass effect or midline shift. No   abnormal extra-axial fluid collections are present. Major flow-voids at   the base of the brain follow expected course and contour.    The calvarium is intact. The visualized intraorbital compartments,   paranasal sinuses and tympanomastoid cavities appear free of acute   disease. Abnormal signal within the premalar soft tissues likely   represent cosmetic augmentation material.      IMPRESSION:  Acute right thalamocapsular infarction.  Mild microvascular ischemic change.    < end of copied text >  < from: Transthoracic Echocardiogram (08.31.18 @ 10:48) >  Patient name: MAMADOU MEDRANO  YOB: 1929   Age: 88 (F)   MR#: [id number]  Study Date: 8/31/2018  Location: [facility]ographer: GARETH Valle  Study quality: Technically good  Referring Physician: Eloy Madison MD  Blood Pressure: 142/77 mmHg  Height: 155 cm  Weight: 61 kg  BSA: 1.6 m2  ------------------------------------------------------------------------  PROCEDURE: Transthoracic echocardiogram with 2-D, M-Mode  and complete spectral and color flow Doppler.  INDICATION: Unspecified atrial fibrillation (I48.91)  ------------------------------------------------------------------------  DIMENSIONS:  Dimensions:     Normal Values:  LA:     3.3 cm    2.0 - 4.0 cm  Ao:     2.7 cm    2.0 - 3.8 cm  SEPTUM: 0.9 cm    0.6 - 1.2 cm  PWT:    0.9 cm    0.6 - 1.1 cm  LVIDd:  4.0 cm    3.0 - 5.6 cm  LVIDs:  2.7 cm    1.8 - 4.0 cm  Derived Variables:  LVMI: 69 g/m2  RWT: 0.45  Fractional short: 33 %  Ejection Fraction (Teicholtz): 61 %  ------------------------------------------------------------------------  OBSERVATIONS:  Mitral Valve: Mitral annular calcification, otherwise  normal mitral valve. Moderate mitral regurgitation.  Aortic Root: Normal aortic root.  Aortic Valve: Calcified trileaflet aortic valve with  grossly moderately decreased opening. Peak transaortic  valve gradient equals 24 mm Hg, mean transaortic valve  gradient equals 14 mm Hg.  Left Atrium: Moderately dilated left atrium.  LA volume  index = 43 cc/m2.  Left Ventricle: Normal left ventricular systolic function.  No segmental wall motion abnormalities. Increased relative  wall thickness with normal left ventricular mass index,  consistent with concentric left ventricular remodeling.  Right Heart: Mild right atrial enlargement. Normal right  ventricular size and function. Normal tricuspid valve.  Moderate tricuspid regurgitation. Normal pulmonic valve.  Pericardium/PleuraNormal pericardium with no pericardial  effusion.  Hemodynamic: Estimated right ventricular systolic pressure  equals 58 mm Hg, assuming right atrial pressure equals 10  mm Hg, consistent with moderate pulmonary hypertension.  ------------------------------------------------------------------------  CONCLUSIONS:  1. Mitral annular calcification, otherwise normal mitral  valve.Moderate mitral regurgitation.  2. Calcified trileaflet aortic valve with grossly  moderately decreased opening. Peak transaortic valve  gradient equals 24 mm Hg, mean transaortic valve gradient  equals 14 mm Hg.  3. Moderately dilated left atrium.  LA volume index = 43  cc/m2.  4. Increased relative wall thickness with normal left  ventricular mass index, consistent with concentric left  ventricular remodeling.  5. Normal left ventricular systolic function. No segmental  wall motion abnormalities.  6. Mild right atrial enlargement.  7. Normal right ventricular size and function.  8. Normal tricuspid valve.  Moderate tricuspid  regurgitation.  9. Estimated pulmonary artery systolic pressure equals 58  mm Hg, assuming right atrial pressure equals 10  mm Hg,  consistent with moderate pulmonary hypertension.    < end of copied text >      Consultant(s) Notes Reviewed:      Care Discussed with Consultants/Other Providers:

## 2018-09-04 NOTE — PROGRESS NOTE ADULT - ASSESSMENT
89 y/o woman w/ afib ( on xarelto and metoprolol), HLD, anxiety presented to ED complaining of L sided weakness 2/2 to R sided CVA (R PCA occlusion on CTA) s/p tPa admitted to MICU for close neurological follow up.     *L-sided weakness: Likely cardoembolic  MRI with Acute right thalamocapsular infarction  - CTA Head showed R PCA occlusion; L vertebral artery stenosis s/p tPA  - case d/w neuro discontinue ASA restart Xarelto  - Speech and swallow evaluation  - TTE to r/o thrombus/ embolic phenomena  - repeat head CT without hemorrhagic conversion  - f/u neuro recs regarding restarting Xarelto  - PT/OT - rehab placement  - PMR-consult for acute rehab     *CV:  - hx of afib (on xarelto/ metoprolol/ amiodarone); Rate/rhythm controlled at this time off Metoprolol EKG NSR.  - Goal BP: <180/105 post tPA, followed by gradual normotension  - repeat CT head without hemorrhagic conversion  - resume Xarelto   - BP at goal    *HTN- monitor BP     -Metoprolol 25 BID    *HLD: cont statin    *Anxiety: cont paroxetine, mirtazepine    *Dementia: if no objection from neuro, resume memantine, donepezil    *Constipation: Senna, miralax      *Dispo: needs rehab per PT, medically cleared for discharge if neuro in agreement 87 y/o woman w/ afib ( on xarelto and metoprolol), HLD, anxiety presented to ED complaining of L sided weakness 2/2 to R sided CVA (R PCA occlusion on CTA) s/p tPa admitted to MICU for close neurological follow up.     *L-sided weakness: Likely cardoembolic  MRI with Acute right thalamocapsular infarction  - CTA Head showed R PCA occlusion; L vertebral artery stenosis s/p tPA  - case d/w neuro discontinue ASA restart Xarelto  - Speech and swallow evaluation-on mechanical soft w/ thin liquids  - TTE to r/o thrombus/ embolic phenomena  - repeat head CT without hemorrhagic conversion  - PT/OT - rehab placement  - PMR-consult for acute rehab     *CV:  - hx of afib (on xarelto/ metoprolol/ amiodarone); Rate/rhythm controlled at this time off Metoprolol EKG NSR.  - repeat CT head without hemorrhagic conversion  - resume Xarelto   - BP at goal    *HTN- monitor BP     -Metoprolol 25 BID    *HLD: cont statin    *Anxiety: cont paroxetine, mirtazepine    *Dementia: if no objection from neuro, resume memantine, donepezil    *Constipation: Senna, miralax      *Dispo: needs rehab per PT d/w SW in regards to rehab

## 2018-09-04 NOTE — PROGRESS NOTE ADULT - SUBJECTIVE AND OBJECTIVE BOX
Neurology Follow up note    Subjective: No acute events overnight    Objective:   Vital Signs Last 24 Hrs  T(C): 36.8 (04 Sep 2018 06:43), Max: 36.8 (04 Sep 2018 06:43)  T(F): 98.2 (04 Sep 2018 06:43), Max: 98.2 (04 Sep 2018 06:43)  HR: 60 (04 Sep 2018 06:43) (60 - 82)  BP: 138/81 (04 Sep 2018 06:43) (112/60 - 139/77)  BP(mean): --  RR: 18 (04 Sep 2018 06:43) (16 - 18)  SpO2: 95% (04 Sep 2018 06:43) (95% - 96%)      PHYSICAL EXAM:   General appearance: No acute distress                 Mental Status: AAOx3 (confused to month), fund of knowledge intact, fluent speech, follows simple commands, able to name  Cranial Nerves: EOMI, PERRL, VFF, V1-V3 intact, mild L facial (decreased depression of lower lip), tongue midline  Motor: 2/5 LUE, 3/5 LLE. RUE/RLE NO Drift.  Sensation: decreased sensation on L arm, leg   Coordination: ataxia out of proportion to weakness on the right  Gait: deferred       09-04    137  |  103  |  15  ----------------------------<  101<H>  4.4   |  24  |  0.75    Ca    9.0      04 Sep 2018 07:55  Mg     2.2     09-04      09-04    137  |  103  |  15  ----------------------------<  101<H>  4.4   |  24  |  0.75    Ca    9.0      04 Sep 2018 07:55  Mg     2.2     09-04               13.9   5.73  )-----------( 170      ( 04 Sep 2018 07:55 )             41.5     MEDICATIONS  (STANDING):  aspirin enteric coated 81 milliGRAM(s) Oral daily  atorvastatin 80 milliGRAM(s) Oral at bedtime  diphtheria/tetanus/pertussis (acellular) Vaccine (ADAcel) 0.5 milliLiter(s) IntraMuscular once  enoxaparin Injectable 40 milliGRAM(s) SubCutaneous daily  melatonin 3 milliGRAM(s) Oral at bedtime  metoprolol tartrate 25 milliGRAM(s) Oral two times a day  mirtazapine 15 milliGRAM(s) Oral at bedtime  nystatin Powder 1 Application(s) Topical two times a day  PARoxetine 10 milliGRAM(s) Oral daily  polyethylene glycol 3350 17 Gram(s) Oral daily  senna 2 Tablet(s) Oral at bedtime    MEDICATIONS  (PRN):  acetaminophen   Tablet. 650 milliGRAM(s) Oral every 6 hours PRN mild, moderate, severe pain Neurology Follow up note    Subjective: No acute events overnight    Objective:   Vital Signs Last 24 Hrs  T(C): 36.8 (04 Sep 2018 06:43), Max: 36.8 (04 Sep 2018 06:43)  T(F): 98.2 (04 Sep 2018 06:43), Max: 98.2 (04 Sep 2018 06:43)  HR: 60 (04 Sep 2018 06:43) (60 - 82)  BP: 138/81 (04 Sep 2018 06:43) (112/60 - 139/77)  BP(mean): --  RR: 18 (04 Sep 2018 06:43) (16 - 18)  SpO2: 95% (04 Sep 2018 06:43) (95% - 96%)      PHYSICAL EXAM:   General appearance: No acute distress                 Mental Status: AAOx3 (confused to month), fund of knowledge intact, fluent speech, follows simple commands, able to name  Cranial Nerves: EOMI, PERRL, VFF, V1-V3 intact, mild L facial (decreased depression of lower lip), tongue midline  Motor: 3/5 LUE, 3/5 LLE. RUE/RLE NO Drift and about 5/5.  Sensation: decreased sensation on L arm, leg   Coordination: ataxia out of proportion to weakness on the left  Gait: deferred       09-04    137  |  103  |  15  ----------------------------<  101<H>  4.4   |  24  |  0.75    Ca    9.0      04 Sep 2018 07:55  Mg     2.2     09-04      09-04    137  |  103  |  15  ----------------------------<  101<H>  4.4   |  24  |  0.75    Ca    9.0      04 Sep 2018 07:55  Mg     2.2     09-04               13.9   5.73  )-----------( 170      ( 04 Sep 2018 07:55 )             41.5     MEDICATIONS  (STANDING):  aspirin enteric coated 81 milliGRAM(s) Oral daily  atorvastatin 80 milliGRAM(s) Oral at bedtime  diphtheria/tetanus/pertussis (acellular) Vaccine (ADAcel) 0.5 milliLiter(s) IntraMuscular once  enoxaparin Injectable 40 milliGRAM(s) SubCutaneous daily  melatonin 3 milliGRAM(s) Oral at bedtime  metoprolol tartrate 25 milliGRAM(s) Oral two times a day  mirtazapine 15 milliGRAM(s) Oral at bedtime  nystatin Powder 1 Application(s) Topical two times a day  PARoxetine 10 milliGRAM(s) Oral daily  polyethylene glycol 3350 17 Gram(s) Oral daily  senna 2 Tablet(s) Oral at bedtime    MEDICATIONS  (PRN):  acetaminophen   Tablet. 650 milliGRAM(s) Oral every 6 hours PRN mild, moderate, severe pain

## 2018-09-04 NOTE — PROGRESS NOTE ADULT - ASSESSMENT
89 YO F with a PMH of Dementia, A-Fib (on Xarelto)HLD and sudden onset left hemiparesis and left facial droop due to acute right hemispheric ischemic event. NIHSS 7 MRS 0. CT Head showed no hemorrhage. tPA bolus given after risks/benefits discussed. CTA Head/Neck shows R P2 occlusion. MRI showing an acute right thalamo-capsular infarction likely due to cardio-embolism in the setting of A-Fib.    Recommendations:  - Stop ASA  - Resume home Xarelto  - c/w atorvastatin 80  - A1C: 5.8  - LDL: 128  - Cleared for d/c from neurovascular perspective  - Will now sign off, please recall prn 87 YO F with a PMH of Dementia, A-Fib (on Xarelto) HLD and sudden onset left hemiparesis and left facial droop (left ataxic hemiparesis) due to acute right hemispheric ischemic event. NIHSS 7 MRS 0. CT Head showed no hemorrhage. tPA bolus given (at the time, it was not known that she was on Xarelto) after risks/benefits discussed. CTA Head/Neck shows R P2 occlusion. MRI showing an acute right thalamo-capsular infarction likely due to cardio-embolism in the setting of A-Fib.    Recommendations:  - Stop ASA  - Resume home Xarelto  - c/w atorvastatin 80  - A1C: 5.8  - LDL: 128  - Cleared for d/c from neurovascular perspective  - Will now sign off, please recall prn

## 2018-09-04 NOTE — CONSULT NOTE ADULT - SUBJECTIVE AND OBJECTIVE BOX
Patient is a 88y old  Female who presents with a chief complaint of L sided weakness (04 Sep 2018 14:55)      HPI:  Pt is a 88 year- old woman w a PMH of A-fib (on xarelto, metoprolol, amiodarone), HLD, anxiety presented to ER on 8/30 with complaint of Left sided weakness. Pt reported that she was eating dinner with a friend when patient suddenly "did not feel well." Pt reported that she had finished dinner, paid the bill, and was on the way out of the restaurant when she started having difficulty moving her left leg and left arm.   In the ER Vital signs were stable, CT head initially negative however, CTA showed PCA occlusion.  Stroke code was called (NIH stroke scale: 7), patient was given tPa and admitted to MICU. Pt monitored in MICU, patient seen by SLP and found to have mild dysphagia and started on mech soft + thins on 8/31. MRI on 8/31 showed Acute right thalamocapsular infarction. Mild microvascular ischemic change. Patient seen and follow by PT/OT.         REVIEW OF SYSTEMS  Constitutional: No fever, No weight loss, No fatigue  HEENT: No eye pain, No visual disturbances, No difficulty hearing, No tinnitus, No vertigo, No neck pain  Pulm: No cough, No wheezing, No shortness of breath  Cardio: No chest pain, No palpitations  GI:  No abdominal pain, No nausea, No vomiting, No diarrhea, No constipation  : No dysuria, No frequency, No hematuria, No incontinence  Neuro: No headaches, No memory loss, No loss of strength, No numbness, No tremors  Skin: No itching, No rashes, No lesions   Endo: No temperature intolerance  MSK: No joint pain, No joint swelling, No muscle pain  Psych:  No depression, No anxiety    PAST MEDICAL & SURGICAL HISTORY  Anxiety  Atrial fibrillation  Hyperlipidemia  Familial tremor  No significant past surgical history      SOCIAL HISTORY  Smoking - Denied  EtOH - Denied   Drugs - Denied    FUNCTIONAL HISTORY  Lives in a ____ with ___ and has __ Stairs to Enter + Hand Rails and ___ Stairs inside + Hand Rails   Independent prior with Ambulation and ADLs.     CURRENT FUNCTIONAL STATUS  - Bed Mobility: supervision/Min A   - Transfers: Mod A   - Gait: Mod A x 2 person for 4 steps   - ADLs: UE dressing Min A     ALLERGIES  No Known Allergies        FAMILY HISTORY   No pertinent family history in first degree relatives      VITALS  T(C): 36.8 (09-04-18 @ 06:43), Max: 36.8 (09-04-18 @ 06:43)  HR: 60 (09-04-18 @ 06:43) (60 - 73)  BP: 138/81 (09-04-18 @ 06:43) (112/60 - 138/81)  RR: 18 (09-04-18 @ 06:43) (17 - 18)  SpO2: 95% (09-04-18 @ 06:43) (95% - 96%)  Wt(kg): --    RECENT LABS/IMAGING  CBC Full  -  ( 04 Sep 2018 07:55 )  WBC Count : 5.73 K/uL  Hemoglobin : 13.9 g/dL  Hematocrit : 41.5 %  Platelet Count - Automated : 170 K/uL  Mean Cell Volume : 93.7 fL  Mean Cell Hemoglobin : 31.4 pg  Mean Cell Hemoglobin Concentration : 33.5 %  Auto Neutrophil # : x  Auto Lymphocyte # : x  Auto Monocyte # : x  Auto Eosinophil # : x  Auto Basophil # : x  Auto Neutrophil % : x  Auto Lymphocyte % : x  Auto Monocyte % : x  Auto Eosinophil % : x  Auto Basophil % : x    09-04    137  |  103  |  15  ----------------------------<  101<H>  4.4   |  24  |  0.75    Ca    9.0      04 Sep 2018 07:55  Mg     2.2     09-04      < from: CT Head No Cont (09.02.18 @ 17:03) >  IMPRESSION:  No evidence for hemorrhagic transformation of right thalamocapsular   infarction.  No other interval change.  Microvascular ischemic change.    < end of copied text >    < from: MR Head No Cont (08.31.18 @ 21:38) >  IMPRESSION:  Acute right thalamocapsular infarction.  Mild microvascular ischemic change.    < end of copied text >      < from: CT Head No Cont (08.31.18 @ 11:18) >    IMPRESSION:   No CT evidence of acute intracranial hemorrhage, brain edema, or mass   effect.     < end of copied text >    < from: CT Angio Neck w/ IV Cont (08.30.18 @ 20:06) >  IMPRESSION:     CT angiography neck: No evidence of hemodynamically significant stenosis   using NASCET criteria.  Patent vertebral arteries.  No evidence of   vascular dissection.    CT angiography brain: Occlusion of the right posterior cerebral artery.    Stenosis of the left vertebral artery.    No other significant stenosis or vessel occlusion identified.    < end of copied text >      MEDICATIONS   acetaminophen   Tablet. 650 milliGRAM(s) Oral every 6 hours PRN  atorvastatin 80 milliGRAM(s) Oral at bedtime  diphtheria/tetanus/pertussis (acellular) Vaccine (ADAcel) 0.5 milliLiter(s) IntraMuscular once  melatonin 3 milliGRAM(s) Oral at bedtime  metoprolol tartrate 25 milliGRAM(s) Oral two times a day  mirtazapine 15 milliGRAM(s) Oral at bedtime  nystatin Powder 1 Application(s) Topical two times a day  PARoxetine 10 milliGRAM(s) Oral daily  polyethylene glycol 3350 17 Gram(s) Oral daily  senna 2 Tablet(s) Oral at bedtime      ---------------------------------------------------------------------------------------- Patient is a 88y old  Female who presents with a chief complaint of L sided weakness (04 Sep 2018 14:55)      HPI:  Pt is a 88 year- old woman w a PMH of A-fib (on xarelto, metoprolol, amiodarone), HLD, anxiety presented to ER on 8/30 with complaint of Left sided weakness. Pt reported that she was eating dinner with a friend when patient suddenly "did not feel well." Pt reported that she had finished dinner, paid the bill, and was on the way out of the restaurant when she started having difficulty moving her left leg and left arm.   In the ER Vital signs were stable, CT head initially negative however, CTA showed PCA occlusion.  Stroke code was called (NIH stroke scale: 7), patient was given tPa and admitted to MICU. Pt monitored in MICU, patient seen by SLP and found to have mild dysphagia and started on mech soft + thins on 8/31. MRI on 8/31 showed Acute right thalamocapsular infarction. Mild microvascular ischemic change. Patient seen and follow by PT/OT.         REVIEW OF SYSTEMS  Constitutional: No fever, No weight loss, No fatigue  HEENT: No eye pain, No visual disturbances, No difficulty hearing, No tinnitus, No vertigo, No neck pain  Pulm: No cough, No wheezing, No shortness of breath  Cardio: No chest pain, No palpitations  GI:  No abdominal pain, No nausea, No vomiting, No diarrhea, No constipation  : No dysuria, No frequency, No hematuria, No incontinence  Neuro: No headaches, No memory loss, No loss of strength, No numbness, No tremors  Skin: No itching, No rashes, No lesions   Endo: No temperature intolerance  MSK: No joint pain, No joint swelling, No muscle pain  Psych:  No depression, No anxiety    PAST MEDICAL & SURGICAL HISTORY  Anxiety  Atrial fibrillation  Hyperlipidemia  Familial tremor  No significant past surgical history      SOCIAL HISTORY  Smoking - Denied  EtOH - Denied   Drugs - Denied    FUNCTIONAL HISTORY  Patient reports she lives alone in an apartment building with no steps to enter. Once inside,  has an elevator to reach her apartment located on the [address]st floor, shower stall in her bathroom with grab bar available. has HHA for 8 hours day x 5 days week to assist her with IADL    Independent prior with Ambulation and ADLs.     CURRENT FUNCTIONAL STATUS  - Bed Mobility: supervision/Min A   - Transfers: Mod A   - Gait: Mod A x 2 person for 4 steps   - ADLs: UE dressing Min A     ALLERGIES  No Known Allergies        FAMILY HISTORY   No pertinent family history in first degree relatives      VITALS  T(C): 36.8 (09-04-18 @ 06:43), Max: 36.8 (09-04-18 @ 06:43)  HR: 60 (09-04-18 @ 06:43) (60 - 73)  BP: 138/81 (09-04-18 @ 06:43) (112/60 - 138/81)  RR: 18 (09-04-18 @ 06:43) (17 - 18)  SpO2: 95% (09-04-18 @ 06:43) (95% - 96%)  Wt(kg): --    RECENT LABS/IMAGING  CBC Full  -  ( 04 Sep 2018 07:55 )  WBC Count : 5.73 K/uL  Hemoglobin : 13.9 g/dL  Hematocrit : 41.5 %  Platelet Count - Automated : 170 K/uL  Mean Cell Volume : 93.7 fL  Mean Cell Hemoglobin : 31.4 pg  Mean Cell Hemoglobin Concentration : 33.5 %  Auto Neutrophil # : x  Auto Lymphocyte # : x  Auto Monocyte # : x  Auto Eosinophil # : x  Auto Basophil # : x  Auto Neutrophil % : x  Auto Lymphocyte % : x  Auto Monocyte % : x  Auto Eosinophil % : x  Auto Basophil % : x    09-04    137  |  103  |  15  ----------------------------<  101<H>  4.4   |  24  |  0.75    Ca    9.0      04 Sep 2018 07:55  Mg     2.2     09-04      < from: CT Head No Cont (09.02.18 @ 17:03) >  IMPRESSION:  No evidence for hemorrhagic transformation of right thalamocapsular   infarction.  No other interval change.  Microvascular ischemic change.    < end of copied text >    < from: MR Head No Cont (08.31.18 @ 21:38) >  IMPRESSION:  Acute right thalamocapsular infarction.  Mild microvascular ischemic change.    < end of copied text >      < from: CT Head No Cont (08.31.18 @ 11:18) >    IMPRESSION:   No CT evidence of acute intracranial hemorrhage, brain edema, or mass   effect.     < end of copied text >    < from: CT Angio Neck w/ IV Cont (08.30.18 @ 20:06) >  IMPRESSION:     CT angiography neck: No evidence of hemodynamically significant stenosis   using NASCET criteria.  Patent vertebral arteries.  No evidence of   vascular dissection.    CT angiography brain: Occlusion of the right posterior cerebral artery.    Stenosis of the left vertebral artery.    No other significant stenosis or vessel occlusion identified.    < end of copied text >      MEDICATIONS   acetaminophen   Tablet. 650 milliGRAM(s) Oral every 6 hours PRN  atorvastatin 80 milliGRAM(s) Oral at bedtime  diphtheria/tetanus/pertussis (acellular) Vaccine (ADAcel) 0.5 milliLiter(s) IntraMuscular once  melatonin 3 milliGRAM(s) Oral at bedtime  metoprolol tartrate 25 milliGRAM(s) Oral two times a day  mirtazapine 15 milliGRAM(s) Oral at bedtime  nystatin Powder 1 Application(s) Topical two times a day  PARoxetine 10 milliGRAM(s) Oral daily  polyethylene glycol 3350 17 Gram(s) Oral daily  senna 2 Tablet(s) Oral at bedtime      ---------------------------------------------------------------------------------------- Patient is a 88y old  Female who presents with a chief complaint of L sided weakness (04 Sep 2018 14:55)      HPI:  Pt is a 88 year- old woman w a PMH of A-fib (on xarelto, metoprolol, amiodarone), HLD, anxiety presented to ER on 8/30 with complaint of Left sided weakness. Pt reported that she was eating dinner with a friend when patient suddenly "did not feel well." Pt reported that she had finished dinner, paid the bill, and was on the way out of the restaurant when she started having difficulty moving her left leg and left arm.     In the ER Vital signs were stable, CT head initially negative however, CTA showed PCA occlusion.  Stroke code was called (NIH stroke scale: 7), patient was given tPa and admitted to MICU. Pt monitored in MICU, patient seen by SLP and found to have mild dysphagia and started on mech soft + thins on 8/31. MRI on 8/31 showed Acute right thalamocapsular infarction. Mild microvascular ischemic change. Patient seen and follow by PT/OT.         REVIEW OF SYSTEMS  Constitutional: No fever,   HEENT:  No visual disturbances, No difficulty hearing, No vertigo,  Pulm: No cough, No shortness of breath  Cardio: No chest pain, No palpitations, irregular heart rate   GI:  No abdominal pain, No nausea, No vomiting, No diarrhea, No constipation, recent Bowel movement   : No dysuria, No frequency, No hematuria, No incontinence  Neuro: No headaches, No memory loss, + loss of strength, + facial weakness,  No numbness, No tremors  Skin: No itching, No rashes, No lesions   Endo: No temperature intolerance  MSK: No joint pain, No joint swelling, No muscle pain  Psych:  No depression, No current anxiety    PAST MEDICAL & SURGICAL HISTORY  Anxiety  Atrial fibrillation  Hyperlipidemia  Familial tremor  No significant past surgical history      SOCIAL HISTORY  Smoking - Denied  EtOH - Denied   Drugs - Denied    FUNCTIONAL HISTORY  Patient reports she lives alone in an apartment building with no steps to enter. Once inside,  has an elevator to reach her apartment located on the [address]st floor, shower stall in her bathroom with grab bar available. has HHA from 8am-6pm for 6 days week to assist her with IADL  Was Independent prior with Ambulation and ADLs.     CURRENT FUNCTIONAL STATUS  - Bed Mobility: supervision/Min A   - Transfers: Mod A   - Gait: Mod A x 2 person for 4 steps   - ADLs: UE dressing Min A     ALLERGIES  No Known Allergies    FAMILY HISTORY   No pertinent family history in first degree relatives      VITALS  T(C): 36.8 (09-04-18 @ 06:43), Max: 36.8 (09-04-18 @ 06:43)  HR: 60 (09-04-18 @ 06:43) (60 - 73)  BP: 138/81 (09-04-18 @ 06:43) (112/60 - 138/81)  RR: 18 (09-04-18 @ 06:43) (17 - 18)  SpO2: 95% (09-04-18 @ 06:43) (95% - 96%)  Wt(kg): --    RECENT LABS/IMAGING  CBC Full  -  ( 04 Sep 2018 07:55 )  WBC Count : 5.73 K/uL  Hemoglobin : 13.9 g/dL  Hematocrit : 41.5 %  Platelet Count - Automated : 170 K/uL  Mean Cell Volume : 93.7 fL  Mean Cell Hemoglobin : 31.4 pg  Mean Cell Hemoglobin Concentration : 33.5 %  Auto Neutrophil # : x  Auto Lymphocyte # : x  Auto Monocyte # : x  Auto Eosinophil # : x  Auto Basophil # : x  Auto Neutrophil % : x  Auto Lymphocyte % : x  Auto Monocyte % : x  Auto Eosinophil % : x  Auto Basophil % : x    09-04    137  |  103  |  15  ----------------------------<  101<H>  4.4   |  24  |  0.75    Ca    9.0      04 Sep 2018 07:55  Mg     2.2     09-04      < from: CT Head No Cont (09.02.18 @ 17:03) >  IMPRESSION:  No evidence for hemorrhagic transformation of right thalamocapsular   infarction.  No other interval change.  Microvascular ischemic change.    < end of copied text >    < from: MR Head No Cont (08.31.18 @ 21:38) >  IMPRESSION:  Acute right thalamocapsular infarction.  Mild microvascular ischemic change.    < end of copied text >      < from: CT Head No Cont (08.31.18 @ 11:18) >    IMPRESSION:   No CT evidence of acute intracranial hemorrhage, brain edema, or mass   effect.     < end of copied text >    < from: CT Angio Neck w/ IV Cont (08.30.18 @ 20:06) >  IMPRESSION:     CT angiography neck: No evidence of hemodynamically significant stenosis   using NASCET criteria.  Patent vertebral arteries.  No evidence of   vascular dissection.    CT angiography brain: Occlusion of the right posterior cerebral artery.    Stenosis of the left vertebral artery.    No other significant stenosis or vessel occlusion identified.    < end of copied text >      MEDICATIONS   acetaminophen   Tablet. 650 milliGRAM(s) Oral every 6 hours PRN  atorvastatin 80 milliGRAM(s) Oral at bedtime  diphtheria/tetanus/pertussis (acellular) Vaccine (ADAcel) 0.5 milliLiter(s) IntraMuscular once  melatonin 3 milliGRAM(s) Oral at bedtime  metoprolol tartrate 25 milliGRAM(s) Oral two times a day  mirtazapine 15 milliGRAM(s) Oral at bedtime  nystatin Powder 1 Application(s) Topical two times a day  PARoxetine 10 milliGRAM(s) Oral daily  polyethylene glycol 3350 17 Gram(s) Oral daily  senna 2 Tablet(s) Oral at bedtime      ----------------------------------------------------------------------------------------    PHYSICAL EXAM  Constitutional: NAD, Resting Comfortable, family present at bedside.   HEENT: NC/AT, Normal Conjunctivae, EOMI, PERRLA   Neck: Supple, FROM,   Chest: No Respiratory Distress,  Lungs CTAB, No Rales/Rhonchi/Wheezing  CV: irregular, No Murmurs/Gallops/Rubs, No Peripheral Edema  Abdomen: ND/NT, Soft, BS+  MSK: No joint swelling, Full ROM   Ext: No C/C/E, Pulses 2+ throughout, No calf tenderness   Neuro Exam      Cognitive: AAO x 3     Communication:  Fluent, Mild dysarthria      CN II - XII  intact except mild facial droop on left        Coordination: FTN impaired on left.      Motor             LEFT    UE: ShAB 3/5, EF 3/5, EE 3/5, WE 4/5,  decreased             RIGHT UE: ShAB 5/5, EF 5/5, EE 5/5, WE 5/5,  5/5             LEFT    LE:  HF 2/5, KE 4/5, DF 3/5, EHL 3/5,  PF 3/5             RIGHT LE:  HF 5/5, KE 5/5, DF 5/5, EHL 5/5, PF 5/5        Sensory: Intact to light touch     Tone: Normal     Reflexes: DTR Intact 3+ throughout,  Negative Hoffmans, +Babinski on the right  Psychiatric: Affect WNL    ASSESSMENT/PLAN  88 year- old woman w a PMH of A-fib (on xarelto, metoprolol, amiodarone), HLD, anxiety presented to ER on 8/30 with complaint of Left sided weakness with functional, gait, ADL, impairments. Patient is a 88y old  Female who presents with a chief complaint of L sided weakness (04 Sep 2018 14:55)      HPI:  Pt is a 88 year- old woman w a PMH of A-fib (on xarelto, metoprolol, amiodarone), HLD, anxiety presented to ER on 8/30 with complaint of Left sided weakness. Pt reported that she was eating dinner with a friend when patient suddenly "did not feel well." Pt reported that she had finished dinner, paid the bill, and was on the way out of the restaurant when she started having difficulty moving her left leg and left arm.     In the ER Vital signs were stable, CT head initially negative however, CTA showed PCA occlusion.  Stroke code was called (NIH stroke scale: 7), patient was given tPa and admitted to MICU. Pt monitored in MICU, patient seen by SLP and found to have mild dysphagia and started on mech soft + thins on 8/31. MRI on 8/31 showed Acute right thalamocapsular infarction. Mild microvascular ischemic change. Patient seen and follow by PT/OT.         REVIEW OF SYSTEMS  Constitutional: No fever,   HEENT:  No visual disturbances, No difficulty hearing, No vertigo,  Pulm: No cough, No shortness of breath  Cardio: No chest pain, No palpitations, irregular heart rate   GI:  No abdominal pain, No nausea, No vomiting, No diarrhea, No constipation, recent Bowel movement   : No dysuria, No frequency, No hematuria, No incontinence  Neuro: No headaches, No memory loss, + loss of strength, + facial weakness,  No numbness, No tremors  Skin: No itching, No rashes, No lesions   Endo: No temperature intolerance  MSK: No joint pain, No joint swelling, No muscle pain  Psych:  No depression, No current anxiety    PAST MEDICAL & SURGICAL HISTORY  Anxiety  Atrial fibrillation  Hyperlipidemia  Familial tremor  No significant past surgical history      SOCIAL HISTORY  Smoking - Denied  EtOH - Denied   Drugs - Denied    FUNCTIONAL HISTORY  Patient reports she lives alone in an apartment building with no steps to enter. Once inside,  has an elevator to reach her apartment located on the [address]st floor, shower stall in her bathroom with grab bar available. has HHA from 8am-6pm for 6 days week to assist her with IADLs.   Was Independent prior with Ambulation and ADLs.     CURRENT FUNCTIONAL STATUS  - Bed Mobility: supervision to Min A   - Transfers: Mod A   - Gait: Mod A x 2 person for 4 steps   - ADLs: UE dressing Min A     ALLERGIES  No Known Allergies    FAMILY HISTORY   No pertinent family history in first degree relatives      VITALS  T(C): 36.8 (09-04-18 @ 06:43), Max: 36.8 (09-04-18 @ 06:43)  HR: 60 (09-04-18 @ 06:43) (60 - 73)  BP: 138/81 (09-04-18 @ 06:43) (112/60 - 138/81)  RR: 18 (09-04-18 @ 06:43) (17 - 18)  SpO2: 95% (09-04-18 @ 06:43) (95% - 96%)  Wt(kg): --    RECENT LABS/IMAGING  CBC Full  -  ( 04 Sep 2018 07:55 )  WBC Count : 5.73 K/uL  Hemoglobin : 13.9 g/dL  Hematocrit : 41.5 %  Platelet Count - Automated : 170 K/uL  Mean Cell Volume : 93.7 fL  Mean Cell Hemoglobin : 31.4 pg  Mean Cell Hemoglobin Concentration : 33.5 %  Auto Neutrophil # : x  Auto Lymphocyte # : x  Auto Monocyte # : x  Auto Eosinophil # : x  Auto Basophil # : x  Auto Neutrophil % : x  Auto Lymphocyte % : x  Auto Monocyte % : x  Auto Eosinophil % : x  Auto Basophil % : x    09-04    137  |  103  |  15  ----------------------------<  101<H>  4.4   |  24  |  0.75    Ca    9.0      04 Sep 2018 07:55  Mg     2.2     09-04      < from: CT Head No Cont (09.02.18 @ 17:03) >  IMPRESSION:  No evidence for hemorrhagic transformation of right thalamocapsular   infarction.  No other interval change.  Microvascular ischemic change.    < end of copied text >    < from: MR Head No Cont (08.31.18 @ 21:38) >  IMPRESSION:  Acute right thalamocapsular infarction.  Mild microvascular ischemic change.    < end of copied text >      < from: CT Head No Cont (08.31.18 @ 11:18) >    IMPRESSION:   No CT evidence of acute intracranial hemorrhage, brain edema, or mass   effect.     < end of copied text >    < from: CT Angio Neck w/ IV Cont (08.30.18 @ 20:06) >  IMPRESSION:     CT angiography neck: No evidence of hemodynamically significant stenosis   using NASCET criteria.  Patent vertebral arteries.  No evidence of   vascular dissection.    CT angiography brain: Occlusion of the right posterior cerebral artery.    Stenosis of the left vertebral artery.    No other significant stenosis or vessel occlusion identified.    < end of copied text >      MEDICATIONS   acetaminophen   Tablet. 650 milliGRAM(s) Oral every 6 hours PRN  atorvastatin 80 milliGRAM(s) Oral at bedtime  diphtheria/tetanus/pertussis (acellular) Vaccine (ADAcel) 0.5 milliLiter(s) IntraMuscular once  melatonin 3 milliGRAM(s) Oral at bedtime  metoprolol tartrate 25 milliGRAM(s) Oral two times a day  mirtazapine 15 milliGRAM(s) Oral at bedtime  nystatin Powder 1 Application(s) Topical two times a day  PARoxetine 10 milliGRAM(s) Oral daily  polyethylene glycol 3350 17 Gram(s) Oral daily  senna 2 Tablet(s) Oral at bedtime      ----------------------------------------------------------------------------------------    PHYSICAL EXAM  Constitutional: NAD, Resting Comfortable, family present at bedside.   HEENT: NC/AT, Normal Conjunctivae, EOMI, PERRLA   Neck: Supple, FROM,   Chest: No Respiratory Distress,  Lungs CTAB, No Rales/Rhonchi/Wheezing  CV: irregular, No Murmurs/Gallops/Rubs, No Peripheral Edema  Abdomen: ND/NT, Soft, BS+  MSK: No joint swelling, Full ROM   Ext: No C/C/E, Pulses 2+ throughout, No calf tenderness   Neuro Exam      Cognitive: AAO x 3     Communication:  Fluent, Mild dysarthria      CN II - XII  intact except mild facial droop on left        Coordination: FTN impaired on left.      Motor             LEFT    UE: ShAB 3/5, EF 3/5, EE 3/5, WE 4/5,  decreased             RIGHT UE: ShAB 5/5, EF 5/5, EE 5/5, WE 5/5,  5/5             LEFT    LE:  HF 2/5, KE 3/5, DF 3/5, EHL 3/5,  PF 3/5             RIGHT LE:  HF 5/5, KE 5/5, DF 5/5, EHL 5/5, PF 5/5        Sensory: Intact to light touch     Tone: Normal     Reflexes: DTR Intact 3+ throughout,  Negative Hoffmans, +Babinski on the right  Psychiatric: Affect WNL    ASSESSMENT/PLAN  88 year- old woman w a PMH of A-fib (on xarelto, metoprolol, amiodarone), HLD, anxiety presented to ER on 8/30 with complaint of Left sided weakness with functional, gait, ADL, impairments.    Disposition -  - Recommend ACUTE inpatient rehabilitation for the functional deficits consisting of 3 hours of therapy/day & 24 hour RN/daily PMR physician for comorbid medical management. Will continue to follow for ongoing rehab needs and recommendations.    PT: Bed Mobility, Transfers, Ambulation with AD     OT:  ADLs    Diet:  Mech soft + thins // SLP     Precautions / PPX:   - Falls  - Pressure injury/Skin: Turn Q2hrs while in bed   - DVT: on xarelto     Weight bearing status: WBAT

## 2018-09-05 ENCOUNTER — TRANSCRIPTION ENCOUNTER (OUTPATIENT)
Age: 83
End: 2018-09-05

## 2018-09-05 VITALS
DIASTOLIC BLOOD PRESSURE: 88 MMHG | SYSTOLIC BLOOD PRESSURE: 141 MMHG | HEART RATE: 85 BPM | RESPIRATION RATE: 18 BRPM | OXYGEN SATURATION: 96 % | TEMPERATURE: 97 F

## 2018-09-05 PROBLEM — I48.91 UNSPECIFIED ATRIAL FIBRILLATION: Chronic | Status: ACTIVE | Noted: 2018-08-30

## 2018-09-05 PROBLEM — F41.9 ANXIETY DISORDER, UNSPECIFIED: Chronic | Status: ACTIVE | Noted: 2018-08-30

## 2018-09-05 PROCEDURE — 99239 HOSP IP/OBS DSCHRG MGMT >30: CPT

## 2018-09-05 PROCEDURE — 99232 SBSQ HOSP IP/OBS MODERATE 35: CPT | Mod: GC

## 2018-09-05 RX ORDER — METOPROLOL TARTRATE 50 MG
1 TABLET ORAL
Qty: 0 | Refills: 0 | DISCHARGE
Start: 2018-09-05

## 2018-09-05 RX ORDER — SENNA PLUS 8.6 MG/1
2 TABLET ORAL
Qty: 0 | Refills: 0 | DISCHARGE
Start: 2018-09-05

## 2018-09-05 RX ORDER — MEMANTINE HYDROCHLORIDE 10 MG/1
1 TABLET ORAL
Qty: 30 | Refills: 0 | OUTPATIENT
Start: 2018-09-05

## 2018-09-05 RX ORDER — LANOLIN ALCOHOL/MO/W.PET/CERES
1 CREAM (GRAM) TOPICAL
Qty: 0 | Refills: 0 | COMMUNITY

## 2018-09-05 RX ORDER — DONEPEZIL HYDROCHLORIDE 10 MG/1
1 TABLET, FILM COATED ORAL
Qty: 0 | Refills: 0 | DISCHARGE
Start: 2018-09-05

## 2018-09-05 RX ORDER — MEMANTINE HYDROCHLORIDE 10 MG/1
10 TABLET ORAL DAILY
Qty: 0 | Refills: 0 | Status: DISCONTINUED | OUTPATIENT
Start: 2018-09-05 | End: 2018-09-05

## 2018-09-05 RX ORDER — LISINOPRIL 2.5 MG/1
1 TABLET ORAL
Qty: 0 | Refills: 0 | COMMUNITY

## 2018-09-05 RX ORDER — METOPROLOL TARTRATE 50 MG
1 TABLET ORAL
Qty: 0 | Refills: 0 | COMMUNITY

## 2018-09-05 RX ORDER — ATORVASTATIN CALCIUM 80 MG/1
1 TABLET, FILM COATED ORAL
Qty: 0 | Refills: 0 | COMMUNITY

## 2018-09-05 RX ORDER — ATORVASTATIN CALCIUM 80 MG/1
1 TABLET, FILM COATED ORAL
Qty: 0 | Refills: 0 | DISCHARGE
Start: 2018-09-05

## 2018-09-05 RX ORDER — MEMANTINE HYDROCHLORIDE AND DONEPEZIL HYDROCHLORIDE 21; 10 MG/1; MG/1
1 CAPSULE ORAL
Qty: 0 | Refills: 0 | COMMUNITY

## 2018-09-05 RX ORDER — LANOLIN ALCOHOL/MO/W.PET/CERES
2 CREAM (GRAM) TOPICAL
Qty: 0 | Refills: 0 | DISCHARGE
Start: 2018-09-05

## 2018-09-05 RX ORDER — RIVAROXABAN 15 MG-20MG
1 KIT ORAL
Qty: 0 | Refills: 0 | DISCHARGE
Start: 2018-09-05

## 2018-09-05 RX ORDER — AMIODARONE HYDROCHLORIDE 400 MG/1
200 TABLET ORAL DAILY
Qty: 0 | Refills: 0 | Status: DISCONTINUED | OUTPATIENT
Start: 2018-09-05 | End: 2018-09-05

## 2018-09-05 RX ORDER — AMIODARONE HYDROCHLORIDE 400 MG/1
0.5 TABLET ORAL
Qty: 0 | Refills: 0 | COMMUNITY
Start: 2018-09-05

## 2018-09-05 RX ORDER — METOPROLOL TARTRATE 50 MG
1 TABLET ORAL
Qty: 0 | Refills: 0 | COMMUNITY
Start: 2018-09-05

## 2018-09-05 RX ORDER — RIVAROXABAN 15 MG-20MG
1 KIT ORAL
Qty: 0 | Refills: 0 | COMMUNITY

## 2018-09-05 RX ORDER — MIRTAZAPINE 45 MG/1
1 TABLET, ORALLY DISINTEGRATING ORAL
Qty: 0 | Refills: 0 | COMMUNITY
Start: 2018-09-05

## 2018-09-05 RX ORDER — LANOLIN ALCOHOL/MO/W.PET/CERES
1 CREAM (GRAM) TOPICAL
Qty: 0 | Refills: 0 | COMMUNITY
Start: 2018-09-05

## 2018-09-05 RX ORDER — MIRTAZAPINE 45 MG/1
1 TABLET, ORALLY DISINTEGRATING ORAL
Qty: 0 | Refills: 0 | DISCHARGE
Start: 2018-09-05

## 2018-09-05 RX ORDER — AMIODARONE HYDROCHLORIDE 400 MG/1
1 TABLET ORAL
Qty: 0 | Refills: 0 | COMMUNITY

## 2018-09-05 RX ORDER — MIRTAZAPINE 45 MG/1
1 TABLET, ORALLY DISINTEGRATING ORAL
Qty: 0 | Refills: 0 | COMMUNITY

## 2018-09-05 RX ORDER — MIRTAZAPINE 45 MG/1
2 TABLET, ORALLY DISINTEGRATING ORAL
Qty: 0 | Refills: 0 | COMMUNITY
Start: 2018-09-05

## 2018-09-05 RX ORDER — AMIODARONE HYDROCHLORIDE 400 MG/1
1 TABLET ORAL
Qty: 0 | Refills: 0 | DISCHARGE
Start: 2018-09-05

## 2018-09-05 RX ORDER — METOPROLOL TARTRATE 50 MG
0.5 TABLET ORAL
Qty: 0 | Refills: 0 | COMMUNITY
Start: 2018-09-05

## 2018-09-05 RX ORDER — MIRTAZAPINE 45 MG/1
2 TABLET, ORALLY DISINTEGRATING ORAL
Qty: 0 | Refills: 0 | DISCHARGE
Start: 2018-09-05

## 2018-09-05 RX ORDER — AMIODARONE HYDROCHLORIDE 400 MG/1
1 TABLET ORAL
Qty: 0 | Refills: 0 | COMMUNITY
Start: 2018-09-05

## 2018-09-05 RX ORDER — ACETAMINOPHEN 500 MG
2 TABLET ORAL
Qty: 0 | Refills: 0 | DISCHARGE
Start: 2018-09-05

## 2018-09-05 RX ORDER — OMEGA-3 ACID ETHYL ESTERS 1 G
0 CAPSULE ORAL
Qty: 0 | Refills: 0 | COMMUNITY

## 2018-09-05 RX ORDER — NYSTATIN CREAM 100000 [USP'U]/G
1 CREAM TOPICAL
Qty: 0 | Refills: 0 | COMMUNITY
Start: 2018-09-05

## 2018-09-05 RX ORDER — DONEPEZIL HYDROCHLORIDE 10 MG/1
10 TABLET, FILM COATED ORAL AT BEDTIME
Qty: 0 | Refills: 0 | Status: DISCONTINUED | OUTPATIENT
Start: 2018-09-05 | End: 2018-09-05

## 2018-09-05 RX ORDER — POLYETHYLENE GLYCOL 3350 17 G/17G
17 POWDER, FOR SOLUTION ORAL
Qty: 0 | Refills: 0 | DISCHARGE
Start: 2018-09-05

## 2018-09-05 RX ADMIN — Medication 650 MILLIGRAM(S): at 13:24

## 2018-09-05 RX ADMIN — NYSTATIN CREAM 1 APPLICATION(S): 100000 CREAM TOPICAL at 05:45

## 2018-09-05 RX ADMIN — MEMANTINE HYDROCHLORIDE 10 MILLIGRAM(S): 10 TABLET ORAL at 12:55

## 2018-09-05 RX ADMIN — Medication 10 MILLIGRAM(S): at 12:29

## 2018-09-05 RX ADMIN — POLYETHYLENE GLYCOL 3350 17 GRAM(S): 17 POWDER, FOR SOLUTION ORAL at 05:56

## 2018-09-05 RX ADMIN — Medication 650 MILLIGRAM(S): at 12:24

## 2018-09-05 RX ADMIN — Medication 25 MILLIGRAM(S): at 05:45

## 2018-09-05 NOTE — DISCHARGE NOTE ADULT - PLAN OF CARE
Resolution of symptoms Follow with PMD within 1 week  Take medications as prescribed Follow with PMD

## 2018-09-05 NOTE — DISCHARGE NOTE ADULT - ADDITIONAL INSTRUCTIONS
Follow with PMD Dr Lakhani within 1 week after hospital discharge. Call for appointment  Take medications as prescribed.   Follow with neurologist Dr Libman within 1 week as well. Call for appointment

## 2018-09-05 NOTE — PROGRESS NOTE ADULT - ATTENDING COMMENTS
1. Neuro: S/p R PCA artery CVA presenting with dysarthria and L sided weakness. Pt says no improvement of symptoms after TPA. Pt still with L sided  weakness and mild l facial droop. For repeat CT head today. Follow TPA /stroke protocol.  TTE  results pending. Dysphagia screening.
agree with above; ROS otherwise negative
discharge 37 min

## 2018-09-05 NOTE — DISCHARGE NOTE ADULT - PATIENT PORTAL LINK FT
You can access the [facility] Patient Portal, offered by [facility], by registering with the following website: [email or web address]

## 2018-09-05 NOTE — DISCHARGE NOTE ADULT - CARE PROVIDER_API CALL
Andreia Lakhani), Cardiovascular Disease; Internal Medicine  [address] [facility] [address] [facility]  [address]  [place] [address]  Phone: [phone]  Fax: [phone]    Libman, Richard B (MD), Neurology; Vascular Neurology  [address] [facility] [address]  [address]  [place] [address]  Phone: [phone]  Fax: [phone]

## 2018-09-05 NOTE — DISCHARGE NOTE ADULT - MEDICATION SUMMARY - MEDICATIONS TO STOP TAKING
I will STOP taking the medications listed below when I get home from the hospital:    Namzaric 28 mg-10 mg oral capsule, extended release  -- 1 cap(s) by mouth once a day    lisinopril 2.5 mg oral tablet  -- 1 tab(s) by mouth once a day    Fish Oil oral capsule

## 2018-09-05 NOTE — DISCHARGE NOTE ADULT - HOSPITAL COURSE
Dx: Acute CVA    Pt is a 87 yo woman w/ afib (on xarelto and metoprolol), HLD, anxiety presented to ED complaining of L sided weakness 2/2 to R sided CVA (R PCA occlusion on CTA) s/p tPa admitted to MICU for close neurological follow up.     +Acute right thalamocapsular infarction    9/2 SBP soft 92/52 IVF bolus 500cc x 1 dose   CT Head- No acute intracranial hemorrhage, mass effect or midline shift.  CT Angio of Head & Neck- CT angiography neck: No evidence of hemodynamically significant stenosis using NASCET criteria.  Patent vertebral arteries.  No evidence of vascular dissection.    CT angiography brain: Occlusion of the right posterior cerebral artery.  Stenosis of the left vertebral artery. No other significant stenosis or vessel occlusion identified.  Repeat CT Head- No CT evidence of acute intracranial hemorrhage, brain edema, or mass effect.     Echo- EF 61%, 1. Mitral annular calcification, otherwise normal mitral valve. Moderate mitral regurgitation.  2. Calcified trileaflet aortic valve with grossly moderately decreased opening. Peak transaortic valve gradient equals 24 mm Hg, mean transaortic valve gradient equals 14 mm Hg. 3. Moderately dilated left atrium.  LA volume index = 43  cc/m2. 4. Increased relative wall thickness with normal left ventricular mass index, consistent with concentric left ventricular remodeling.  5. Normal left ventricular systolic function. No segmental wall motion abnormalities.  6. Mild right atrial enlargement.  7. Normal right ventricular size and function.  8. Normal tricuspid valve.  Moderate tricuspid  regurgitation. 9. Estimated pulmonary artery systolic pressure equals 58  mm Hg, assuming right atrial pressure equals 10  mm Hg,  consistent with moderate pulmonary hypertension.    9/1 CTH: IMPRESSION:  Stable exam.  Stable right thalamocapsular acute infarct without hemorrhagic   transformation.     MRI: Acute right thalamocapsular infarction.  Mild microvascular ischemic change.  9/2 Repeat CT head: No evidence for hemorrhagic transformation of right thalamocapsular infarction. No other interval change. Microvascular ischemic change.

## 2018-09-05 NOTE — DISCHARGE NOTE ADULT - CARE PROVIDERS DIRECT ADDRESSES
[email or web address]

## 2018-09-05 NOTE — PROGRESS NOTE ADULT - SUBJECTIVE AND OBJECTIVE BOX
Patient is a 88y old  Female who presents with a chief complaint of L sided weakness (04 Sep 2018 14:55)      SUBJECTIVE / OVERNIGHT EVENTS:    MEDICATIONS  (STANDING):  atorvastatin 80 milliGRAM(s) Oral at bedtime  diphtheria/tetanus/pertussis (acellular) Vaccine (ADAcel) 0.5 milliLiter(s) IntraMuscular once  melatonin 3 milliGRAM(s) Oral at bedtime  metoprolol tartrate 25 milliGRAM(s) Oral two times a day  mirtazapine 15 milliGRAM(s) Oral at bedtime  nystatin Powder 1 Application(s) Topical two times a day  PARoxetine 10 milliGRAM(s) Oral daily  polyethylene glycol 3350 17 Gram(s) Oral daily  rivaroxaban 15 milliGRAM(s) Oral every 24 hours  senna 2 Tablet(s) Oral at bedtime    MEDICATIONS  (PRN):  acetaminophen   Tablet. 650 milliGRAM(s) Oral every 6 hours PRN mild, moderate, severe pain        CAPILLARY BLOOD GLUCOSE        I&O's Summary      T(C): 36.2 (09-05-18 @ 05:44), Max: 37.2 (09-04-18 @ 11:30)  HR: 85 (09-05-18 @ 05:44) (70 - 85)  BP: 141/88 (09-05-18 @ 05:44) (111/68 - 141/88)  RR: 18 (09-05-18 @ 05:44) (18 - 18)  SpO2: 96% (09-05-18 @ 05:44) (95% - 99%)    PHYSICAL EXAM:  GENERAL: NAD, well-developed  HEAD:  Atraumatic, Normocephalic  EYES: EOMI, PERRLA, conjunctiva and sclera clear  NECK: Supple, No JVD  CHEST/LUNG: Clear to auscultation bilaterally; No wheeze  HEART: Regular rate and rhythm; No murmurs, rubs, or gallops  ABDOMEN: Soft, Nontender, Nondistended; Bowel sounds present  EXTREMITIES:  2+ Peripheral Pulses, No clubbing, cyanosis, or edema  PSYCH: AAOx3  NEUROLOGY: non-focal  SKIN: No rashes or lesions    LABS:                        13.9   5.73  )-----------( 170      ( 04 Sep 2018 07:55 )             41.5     09-04    137  |  103  |  15  ----------------------------<  101<H>  4.4   |  24  |  0.75    Ca    9.0      04 Sep 2018 07:55  Mg     2.2     09-04                  RADIOLOGY & ADDITIONAL TESTS:    Imaging Personally Reviewed:    Consultant(s) Notes Reviewed:      Care Discussed with Consultants/Other Providers: Patient is a 88y old  Female who presents with a chief complaint of L sided weakness (04 Sep 2018 14:55)      SUBJECTIVE / OVERNIGHT EVENTS: No acute events ON. Tele Afib 70's    MEDICATIONS  (STANDING):  atorvastatin 80 milliGRAM(s) Oral at bedtime  diphtheria/tetanus/pertussis (acellular) Vaccine (ADAcel) 0.5 milliLiter(s) IntraMuscular once  melatonin 3 milliGRAM(s) Oral at bedtime  metoprolol tartrate 25 milliGRAM(s) Oral two times a day  mirtazapine 15 milliGRAM(s) Oral at bedtime  nystatin Powder 1 Application(s) Topical two times a day  PARoxetine 10 milliGRAM(s) Oral daily  polyethylene glycol 3350 17 Gram(s) Oral daily  rivaroxaban 15 milliGRAM(s) Oral every 24 hours  senna 2 Tablet(s) Oral at bedtime    MEDICATIONS  (PRN):  acetaminophen   Tablet. 650 milliGRAM(s) Oral every 6 hours PRN mild, moderate, severe pain        CAPILLARY BLOOD GLUCOSE        I&O's Summary      T(C): 36.2 (09-05-18 @ 05:44), Max: 37.2 (09-04-18 @ 11:30)  HR: 85 (09-05-18 @ 05:44) (70 - 85)  BP: 141/88 (09-05-18 @ 05:44) (111/68 - 141/88)  RR: 18 (09-05-18 @ 05:44) (18 - 18)  SpO2: 96% (09-05-18 @ 05:44) (95% - 99%)    PHYSICAL EXAM:  GENERAL: NAD, well-developed  HEAD:  Atraumatic, Normocephalic  EYES: EOMI, PERRLA, conjunctiva and sclera clear  NECK: Supple, No JVD  CHEST/LUNG: Clear to auscultation bilaterally; No wheeze  HEART: Regular rate and rhythm; No murmurs, rubs, or gallops  ABDOMEN: Soft, Nontender, Nondistended; Bowel sounds present  EXTREMITIES:  2+ Peripheral Pulses, No clubbing, cyanosis, or edema  PSYCH: AAOx3  NEUROLOGY:+Lt facial droop +LT hemiparesis UE 3/5 LE 2/5  SKIN: No rashes or lesions      LABS:                        13.9   5.73  )-----------( 170      ( 04 Sep 2018 07:55 )             41.5     09-04    137  |  103  |  15  ----------------------------<  101<H>  4.4   |  24  |  0.75    Ca    9.0      04 Sep 2018 07:55  Mg     2.2     09-04                  RADIOLOGY & ADDITIONAL TESTS:    Imaging Personally Reviewed:    Consultant(s) Notes Reviewed:      Care Discussed with Consultants/Other Providers:

## 2018-09-05 NOTE — DISCHARGE NOTE ADULT - MEDICATION SUMMARY - MEDICATIONS TO CHANGE
I will SWITCH the dose or number of times a day I take the medications listed below when I get home from the hospital:    Xarelto 20 mg oral tablet  -- 1 tab(s) by mouth once a day (in the evening)    atorvastatin 40 mg oral tablet  -- 1 tab(s) by mouth once a day    Melatonin 5 mg oral tablet  -- 1 tab(s) by mouth once a day (at bedtime)

## 2018-09-05 NOTE — PROGRESS NOTE ADULT - ASSESSMENT
87 y/o woman w/ afib ( on xarelto and metoprolol), HLD, anxiety presented to ED complaining of L sided weakness 2/2 to R sided CVA (R PCA occlusion on CTA) s/p tPa admitted to MICU for close neurological follow up.     *L-sided weakness: Likely cardoembolic  MRI with Acute right thalamocapsular infarction  - CTA Head showed R PCA occlusion; L vertebral artery stenosis s/p tPA  - case d/w neuro discontinue ASA restart Xarelto  - Speech and swallow evaluation-on mechanical soft w/ thin liquids  - TTE- LVH   - repeat head CT without hemorrhagic conversion  - PT/OT - rehab placement  - PMR-consult for acute rehab     *CV:  - hx of afib (on xarelto/ metoprolol/ amiodarone); Rate/rhythm controlled   - repeat CT head without hemorrhagic conversion  -restart amiodarone  - resume Xarelto   - BP at goal    *HTN- monitor BP     -Metoprolol 25 BID    *HLD: cont statin    *Anxiety: cont paroxetine, mirtazepine    *Dementia: resume memantine, donepezil    *Constipation: Senna, miralax      *Dispo: needs rehab per PT d/w SW in regards to rehab 87 y/o woman w/ afib ( on xarelto and metoprolol), HLD, anxiety presented to ED complaining of L sided weakness 2/2 to R sided CVA (R PCA occlusion on CTA) s/p tPa admitted to MICU for close neurological follow up.     *L-sided weakness: Likely cardoembolic  MRI with Acute right thalamocapsular infarction  - CTA Head showed R PCA occlusion; L vertebral artery stenosis s/p tPA  - case d/w neuro discontinue ASA restart Xarelto  - Speech and swallow evaluation-on mechanical soft w/ thin liquids  - TTE- LVH   - repeat head CT without hemorrhagic conversion  - PT/OT - rehab placement  - PMR-consult for acute rehab-d/w SW ramires for rehab    *CV:  - hx of afib (on xarelto/ metoprolol/ amiodarone); Rate/rhythm controlled   - repeat CT head without hemorrhagic conversion  -restart amiodarone  - resume Xarelto   - BP at goal    *HTN- monitor BP     -Metoprolol 25 BID    *HLD: cont statin    *Anxiety: cont paroxetine, mirtazepine    *Dementia: resume memantine, donepezil    *Constipation: Senna, miralax      *Dispo: needs rehab per PT d/w SW in regards to rehab

## 2018-09-05 NOTE — PROGRESS NOTE ADULT - SUBJECTIVE AND OBJECTIVE BOX
Patient is a 88y old  Female who presents with a chief complaint of L sided weakness (04 Sep 2018 14:55)      HPI:  Pt is a 88 year- old woman w a PMH of A-fib (on xarelto, metoprolol, amiodarone), HLD, anxiety presented to ER on 8/30 with complaint of Left sided weakness. Pt reported that she was eating dinner with a friend when patient suddenly "did not feel well." Pt reported that she had finished dinner, paid the bill, and was on the way out of the restaurant when she started having difficulty moving her left leg and left arm.     In the ER Vital signs were stable, CT head initially negative however, CTA showed PCA occlusion.  Stroke code was called (NIH stroke scale: 7), patient was given tPa and admitted to MICU. Pt monitored in MICU, patient seen by SLP and found to have mild dysphagia and started on mech soft + thins on 8/31. MRI on 8/31 showed Acute right thalamocapsular infarction.  Interval: notes LUE is stronger. TTE- LVH.         REVIEW OF SYSTEMS  Constitutional: No fever,   HEENT:  No visual disturbances, No difficulty hearing, No vertigo,  Pulm: No cough, No shortness of breath  Cardio: No chest pain, No palpitations, irregular heart rate   GI:  No abdominal pain, No nausea, No vomiting, No diarrhea, No constipation, recent Bowel movement   : No dysuria, No frequency, No hematuria, No incontinence  Neuro: No headaches, No memory loss, + loss of strength, + facial weakness,  No numbness, No tremors  Skin: No itching, No rashes, No lesions   Endo: No temperature intolerance  MSK: No joint pain, No joint swelling, No muscle pain  Psych:  No depression, No current anxiety      CURRENT FUNCTIONAL STATUS  min a     Vital Signs Last 24 Hrs  T(C): 36.2 (05 Sep 2018 05:44), Max: 37 (04 Sep 2018 17:27)  T(F): 97.2 (05 Sep 2018 05:44), Max: 98.6 (04 Sep 2018 17:27)  HR: 85 (05 Sep 2018 05:44) (70 - 85)  BP: 141/88 (05 Sep 2018 05:44) (111/68 - 141/88)  BP(mean): --  RR: 18 (05 Sep 2018 05:44) (18 - 18)  SpO2: 96% (05 Sep 2018 05:44) (95% - 99%)    ----------------------------------------------------------------------------------------    PHYSICAL EXAM  Constitutional: NAD, Resting Comfortable, family present at bedside.   HEENT: NC/AT, Normal Conjunctivae, EOMI, PERRLA   Neck: Supple, FROM,   Chest: No Respiratory Distress,  Lungs CTAB, No Rales/Rhonchi/Wheezing  CV: irregular, No Murmurs/Gallops/Rubs, No Peripheral Edema  Abdomen: ND/NT, Soft, BS+  MSK: No joint swelling, Full ROM   Ext: No C/C/E, Pulses 2+ throughout, No calf tenderness   Neuro Exam      Cognitive: AAO x 3     Communication:  Fluent, Mild dysarthria      CN II - XII  intact except mild facial droop on left        Coordination: FTN impaired on left.      Motor             LEFT    UE: ShAB 3/5, EF 3/5, EE 3/5, WE 4/5,  decreased             RIGHT UE: ShAB 5/5, EF 5/5, EE 5/5, WE 5/5,  5/5             LEFT    LE:  HF 2/5, KE 3/5, DF 3/5, EHL 3/5,  PF 3/5             RIGHT LE:  HF 5/5, KE 5/5, DF 5/5, EHL 5/5, PF 5/5        Sensory: Intact to light touch     Tone: Normal     Reflexes: DTR Intact 3+ throughout,  Negative Hoffmans, +Babinski on the right  Psychiatric: Affect WNL    ASSESSMENT/PLAN  88 year- old woman w a PMH of A-fib (on xarelto, metoprolol, amiodarone), HLD, anxiety presented to ER on 8/30 with complaint of Left sided weakness with functional, gait, ADL, impairments.    Disposition -  - Recommend ACUTE inpatient rehabilitation, however, daughter would like mom to go to [facility].  PT: Bed Mobility, Transfers, Ambulation with AD     OT:  ADLs    Diet:  Mech soft + thins // SLP

## 2018-09-05 NOTE — DISCHARGE NOTE ADULT - MEDICATION SUMMARY - MEDICATIONS TO TAKE
I will START or STAY ON the medications listed below when I get home from the hospital:    acetaminophen 325 mg oral tablet  -- 2 tab(s) by mouth every 6 hours, As needed, mild, moderate, severe pain  -- Indication: For pain    amiodarone 200 mg oral tablet  -- 1 tab(s) by mouth once a day  -- Indication: For Atrial fibrillation    rivaroxaban 15 mg oral tablet  -- 1 tab(s) by mouth every 24 hours  -- Indication: For Atrial fibrillation    mirtazapine 15 mg oral tablet  -- 1 tab(s) by mouth once a day (at bedtime)  -- Indication: For prophylactic measure    PARoxetine 10 mg oral tablet  -- 1 tab(s) by mouth once a day  -- Indication: For depression    atorvastatin 80 mg oral tablet  -- 1 tab(s) by mouth once a day (at bedtime)  -- Indication: For CVA (cerebral vascular accident)    metoprolol tartrate 25 mg oral tablet  -- 1 tab(s) by mouth 2 times a day  -- Indication: For Atrial fibrillation    donepezil 10 mg oral tablet  -- 1 tab(s) by mouth once a day (at bedtime)  -- Indication: For Neuro medication    nystatin 100,000 units/g topical powder  -- 1 application on skin 2 times a day  -- Indication: For antifungal powder    polyethylene glycol 3350 oral powder for reconstitution  -- 17 gram(s) by mouth once a day  -- Indication: For Constipation    senna oral tablet  -- 2 tab(s) by mouth once a day (at bedtime)  -- Indication: For Constipation    melatonin 3 mg oral tablet  -- 1 tab(s) by mouth once a day (at bedtime)  -- Indication: For sleep aide

## 2018-09-06 NOTE — DISCHARGE NOTE ADULT - CARE PLAN
Detail Level: Detailed Add 53793 Cpt? (Important Note: In 2017 The Use Of 59167 Is Being Tracked By Cms To Determine Future Global Period Reimbursement For Global Periods): no Principal Discharge DX:	CVA (cerebral vascular accident)  Goal:	Resolution of symptoms  Assessment and plan of treatment:	Follow with PMD within 1 week  Take medications as prescribed  Secondary Diagnosis:	Atrial fibrillation  Assessment and plan of treatment:	Follow with PMD  Secondary Diagnosis:	Hyperlipidemia  Assessment and plan of treatment:	Follow with PMD  Secondary Diagnosis:	Anxiety  Assessment and plan of treatment:	Follow with PMD

## 2018-09-07 ENCOUNTER — INPATIENT (INPATIENT)
Facility: HOSPITAL | Age: 83
LOS: 0 days | Discharge: HOME | DRG: 313 | End: 2018-09-08
Attending: INTERNAL MEDICINE | Admitting: INTERNAL MEDICINE
Payer: COMMERCIAL

## 2018-09-07 VITALS
HEART RATE: 55 BPM | SYSTOLIC BLOOD PRESSURE: 147 MMHG | RESPIRATION RATE: 16 BRPM | DIASTOLIC BLOOD PRESSURE: 62 MMHG | OXYGEN SATURATION: 100 % | TEMPERATURE: 98 F

## 2018-09-07 DIAGNOSIS — E78.5 HYPERLIPIDEMIA, UNSPECIFIED: ICD-10-CM

## 2018-09-07 DIAGNOSIS — I48.91 UNSPECIFIED ATRIAL FIBRILLATION: ICD-10-CM

## 2018-09-07 DIAGNOSIS — R07.9 CHEST PAIN, UNSPECIFIED: ICD-10-CM

## 2018-09-07 DIAGNOSIS — Z29.9 ENCOUNTER FOR PROPHYLACTIC MEASURES, UNSPECIFIED: ICD-10-CM

## 2018-09-07 DIAGNOSIS — F03.90 UNSPECIFIED DEMENTIA WITHOUT BEHAVIORAL DISTURBANCE: ICD-10-CM

## 2018-09-07 DIAGNOSIS — I63.9 CEREBRAL INFARCTION, UNSPECIFIED: ICD-10-CM

## 2018-09-07 LAB
ALBUMIN SERPL ELPH-MCNC: 3.9 G/DL — SIGNIFICANT CHANGE UP (ref 3.3–5)
ALP SERPL-CCNC: 69 U/L — SIGNIFICANT CHANGE UP (ref 40–120)
ALT FLD-CCNC: 35 U/L — SIGNIFICANT CHANGE UP (ref 10–45)
ANION GAP SERPL CALC-SCNC: 9 MMOL/L — SIGNIFICANT CHANGE UP (ref 5–17)
APTT BLD: 38.5 SEC — HIGH (ref 27.5–37.4)
AST SERPL-CCNC: 41 U/L — HIGH (ref 10–40)
BASOPHILS # BLD AUTO: 0 K/UL — SIGNIFICANT CHANGE UP (ref 0–0.2)
BASOPHILS NFR BLD AUTO: 0.8 % — SIGNIFICANT CHANGE UP (ref 0–2)
BILIRUB SERPL-MCNC: 0.3 MG/DL — SIGNIFICANT CHANGE UP (ref 0.2–1.2)
BUN SERPL-MCNC: 27 MG/DL — HIGH (ref 7–23)
CALCIUM SERPL-MCNC: 9.9 MG/DL — SIGNIFICANT CHANGE UP (ref 8.4–10.5)
CHLORIDE SERPL-SCNC: 102 MMOL/L — SIGNIFICANT CHANGE UP (ref 96–108)
CK MB CFR SERPL CALC: 1.4 NG/ML — SIGNIFICANT CHANGE UP (ref 0–3.8)
CK SERPL-CCNC: 48 U/L — SIGNIFICANT CHANGE UP (ref 25–170)
CO2 SERPL-SCNC: 28 MMOL/L — SIGNIFICANT CHANGE UP (ref 22–31)
CREAT SERPL-MCNC: 0.96 MG/DL — SIGNIFICANT CHANGE UP (ref 0.5–1.3)
EOSINOPHIL # BLD AUTO: 0.2 K/UL — SIGNIFICANT CHANGE UP (ref 0–0.5)
EOSINOPHIL NFR BLD AUTO: 3.1 % — SIGNIFICANT CHANGE UP (ref 0–6)
GAS PNL BLDV: SIGNIFICANT CHANGE UP
GLUCOSE SERPL-MCNC: 98 MG/DL — SIGNIFICANT CHANGE UP (ref 70–99)
HCT VFR BLD CALC: 43.3 % — SIGNIFICANT CHANGE UP (ref 34.5–45)
HGB BLD-MCNC: 14.1 G/DL — SIGNIFICANT CHANGE UP (ref 11.5–15.5)
INR BLD: 2.1 RATIO — HIGH (ref 0.88–1.16)
LIDOCAIN IGE QN: 49 U/L — SIGNIFICANT CHANGE UP (ref 7–60)
LYMPHOCYTES # BLD AUTO: 1.6 K/UL — SIGNIFICANT CHANGE UP (ref 1–3.3)
LYMPHOCYTES # BLD AUTO: 27.5 % — SIGNIFICANT CHANGE UP (ref 13–44)
MCHC RBC-ENTMCNC: 30.9 PG — SIGNIFICANT CHANGE UP (ref 27–34)
MCHC RBC-ENTMCNC: 32.6 GM/DL — SIGNIFICANT CHANGE UP (ref 32–36)
MCV RBC AUTO: 94.7 FL — SIGNIFICANT CHANGE UP (ref 80–100)
MONOCYTES # BLD AUTO: 0.6 K/UL — SIGNIFICANT CHANGE UP (ref 0–0.9)
MONOCYTES NFR BLD AUTO: 10 % — SIGNIFICANT CHANGE UP (ref 2–14)
NEUTROPHILS # BLD AUTO: 3.4 K/UL — SIGNIFICANT CHANGE UP (ref 1.8–7.4)
NEUTROPHILS NFR BLD AUTO: 58.5 % — SIGNIFICANT CHANGE UP (ref 43–77)
NT-PROBNP SERPL-SCNC: 311 PG/ML — HIGH (ref 0–300)
PLATELET # BLD AUTO: 235 K/UL — SIGNIFICANT CHANGE UP (ref 150–400)
POTASSIUM SERPL-MCNC: 4.4 MMOL/L — SIGNIFICANT CHANGE UP (ref 3.5–5.3)
POTASSIUM SERPL-SCNC: 4.4 MMOL/L — SIGNIFICANT CHANGE UP (ref 3.5–5.3)
PROT SERPL-MCNC: 6.9 G/DL — SIGNIFICANT CHANGE UP (ref 6–8.3)
PROTHROM AB SERPL-ACNC: 23 SEC — HIGH (ref 9.8–12.7)
RBC # BLD: 4.57 M/UL — SIGNIFICANT CHANGE UP (ref 3.8–5.2)
RBC # FLD: 13.4 % — SIGNIFICANT CHANGE UP (ref 10.3–14.5)
SODIUM SERPL-SCNC: 139 MMOL/L — SIGNIFICANT CHANGE UP (ref 135–145)
TROPONIN T, HIGH SENSITIVITY RESULT: 10 NG/L — SIGNIFICANT CHANGE UP (ref 0–51)
TROPONIN T, HIGH SENSITIVITY RESULT: 11 NG/L — SIGNIFICANT CHANGE UP (ref 0–51)
WBC # BLD: 5.9 K/UL — SIGNIFICANT CHANGE UP (ref 3.8–10.5)
WBC # FLD AUTO: 5.9 K/UL — SIGNIFICANT CHANGE UP (ref 3.8–10.5)

## 2018-09-07 PROCEDURE — 73030 X-RAY EXAM OF SHOULDER: CPT | Mod: 26,LT

## 2018-09-07 PROCEDURE — 99285 EMERGENCY DEPT VISIT HI MDM: CPT | Mod: GC,25

## 2018-09-07 PROCEDURE — 71045 X-RAY EXAM CHEST 1 VIEW: CPT | Mod: 26

## 2018-09-07 PROCEDURE — 93010 ELECTROCARDIOGRAM REPORT: CPT | Mod: GC

## 2018-09-07 PROCEDURE — 99223 1ST HOSP IP/OBS HIGH 75: CPT

## 2018-09-07 RX ORDER — ACETAMINOPHEN 500 MG
975 TABLET ORAL ONCE
Qty: 0 | Refills: 0 | Status: COMPLETED | OUTPATIENT
Start: 2018-09-07 | End: 2018-09-07

## 2018-09-07 RX ORDER — DONEPEZIL HYDROCHLORIDE 10 MG/1
10 TABLET, FILM COATED ORAL AT BEDTIME
Qty: 0 | Refills: 0 | Status: DISCONTINUED | OUTPATIENT
Start: 2018-09-07 | End: 2018-09-08

## 2018-09-07 RX ORDER — METOPROLOL TARTRATE 50 MG
25 TABLET ORAL
Qty: 0 | Refills: 0 | Status: DISCONTINUED | OUTPATIENT
Start: 2018-09-07 | End: 2018-09-08

## 2018-09-07 RX ORDER — ACETAMINOPHEN 500 MG
975 TABLET ORAL EVERY 12 HOURS
Qty: 0 | Refills: 0 | Status: DISCONTINUED | OUTPATIENT
Start: 2018-09-07 | End: 2018-09-08

## 2018-09-07 RX ORDER — MIRTAZAPINE 45 MG/1
15 TABLET, ORALLY DISINTEGRATING ORAL AT BEDTIME
Qty: 0 | Refills: 0 | Status: DISCONTINUED | OUTPATIENT
Start: 2018-09-07 | End: 2018-09-08

## 2018-09-07 RX ORDER — POLYETHYLENE GLYCOL 3350 17 G/17G
17 POWDER, FOR SOLUTION ORAL DAILY
Qty: 0 | Refills: 0 | Status: DISCONTINUED | OUTPATIENT
Start: 2018-09-07 | End: 2018-09-08

## 2018-09-07 RX ORDER — ATORVASTATIN CALCIUM 80 MG/1
80 TABLET, FILM COATED ORAL AT BEDTIME
Qty: 0 | Refills: 0 | Status: DISCONTINUED | OUTPATIENT
Start: 2018-09-07 | End: 2018-09-08

## 2018-09-07 RX ORDER — LANOLIN ALCOHOL/MO/W.PET/CERES
3 CREAM (GRAM) TOPICAL AT BEDTIME
Qty: 0 | Refills: 0 | Status: DISCONTINUED | OUTPATIENT
Start: 2018-09-07 | End: 2018-09-08

## 2018-09-07 RX ORDER — LIDOCAINE 4 G/100G
1 CREAM TOPICAL DAILY
Qty: 0 | Refills: 0 | Status: DISCONTINUED | OUTPATIENT
Start: 2018-09-07 | End: 2018-09-08

## 2018-09-07 RX ORDER — DOCUSATE SODIUM 100 MG
100 CAPSULE ORAL
Qty: 0 | Refills: 0 | Status: DISCONTINUED | OUTPATIENT
Start: 2018-09-07 | End: 2018-09-08

## 2018-09-07 RX ORDER — SENNA PLUS 8.6 MG/1
2 TABLET ORAL AT BEDTIME
Qty: 0 | Refills: 0 | Status: DISCONTINUED | OUTPATIENT
Start: 2018-09-07 | End: 2018-09-08

## 2018-09-07 RX ORDER — AMIODARONE HYDROCHLORIDE 400 MG/1
200 TABLET ORAL DAILY
Qty: 0 | Refills: 0 | Status: DISCONTINUED | OUTPATIENT
Start: 2018-09-07 | End: 2018-09-08

## 2018-09-07 RX ORDER — KETOROLAC TROMETHAMINE 30 MG/ML
15 SYRINGE (ML) INJECTION ONCE
Qty: 0 | Refills: 0 | Status: DISCONTINUED | OUTPATIENT
Start: 2018-09-07 | End: 2018-09-07

## 2018-09-07 RX ORDER — RIVAROXABAN 15 MG-20MG
15 KIT ORAL EVERY 24 HOURS
Qty: 0 | Refills: 0 | Status: DISCONTINUED | OUTPATIENT
Start: 2018-09-07 | End: 2018-09-08

## 2018-09-07 RX ADMIN — Medication 100 MILLIGRAM(S): at 20:55

## 2018-09-07 RX ADMIN — Medication 15 MILLIGRAM(S): at 18:13

## 2018-09-07 RX ADMIN — RIVAROXABAN 15 MILLIGRAM(S): KIT at 21:38

## 2018-09-07 RX ADMIN — Medication 3 MILLIGRAM(S): at 20:54

## 2018-09-07 RX ADMIN — SENNA PLUS 2 TABLET(S): 8.6 TABLET ORAL at 20:54

## 2018-09-07 RX ADMIN — Medication 975 MILLIGRAM(S): at 20:54

## 2018-09-07 RX ADMIN — Medication 975 MILLIGRAM(S): at 14:56

## 2018-09-07 RX ADMIN — Medication 975 MILLIGRAM(S): at 17:51

## 2018-09-07 RX ADMIN — DONEPEZIL HYDROCHLORIDE 10 MILLIGRAM(S): 10 TABLET, FILM COATED ORAL at 20:54

## 2018-09-07 RX ADMIN — ATORVASTATIN CALCIUM 80 MILLIGRAM(S): 80 TABLET, FILM COATED ORAL at 20:54

## 2018-09-07 RX ADMIN — MIRTAZAPINE 15 MILLIGRAM(S): 45 TABLET, ORALLY DISINTEGRATING ORAL at 20:54

## 2018-09-07 NOTE — ED ADULT NURSE NOTE - OBJECTIVE STATEMENT
87 y/o female A+ox3, pmhx cva, anxiety, afob, hyperlipidemia, coming from rehab for chest pain x 1 day. Pt treated to CVA at [facility] "last week" and was at rehab for 1 day when she started experiencing left sided chest pain; intermittent, sharp. Pt denies difficulty breathing, abdominal pain, n/v/d, fever or chills, headache, feeling lightheaded, dizziness. IV established, labs obtained. PT left in position of comfort on CM with spo2. Will reassess

## 2018-09-07 NOTE — ED PROVIDER NOTE - ATTENDING CONTRIBUTION TO CARE
87 yo F pmh afib (on xarelto and metoprolol), HLD,  L sided weakness 2/2 to R sided CVA (R PCA occlusion on CTA, right thalamocapsular acute infarct) s/p recent tpa p/w chest pain intermittently since wednesday. states that prior to discharge on wednesday she had chest pain, and then today chest pain recurred. described as 4/10 L sided chest discomfort radiating to the L shoulder. no associated sob or diaphoresis or N/V. reports nonproductive cough. unclear if chest pain is exertional.   denies headache. reports improving L sided deficits since the stroke.   PE lungs CTA. 4-5/5 strength of LUE/LLE. lungs CTA. no chest wall TTP. no abdominal TTP. 87 yo F pmh afib (on xarelto and metoprolol), HLD,  L sided weakness 2/2 to R sided CVA (R PCA occlusion on CTA, right thalamocapsular acute infarct) s/p recent tpa p/w chest pain intermittently since wednesday. states that prior to discharge on wednesday she had chest pain, and then today chest pain recurred. described as 4/10 L sided chest discomfort radiating to the L shoulder. no associated sob or diaphoresis or N/V. reports nonproductive cough. unclear if chest pain is exertional.   denies headache. reports improving L sided deficits since the stroke.   PE lungs CTA. 4-5/5 strength of LUE/LLE. 5/5 strength of R side. unchanged neuro exam, states that weakness has been significantly improving. lungs CTA. no chest wall TTP. no abdominal TTP.  ekg with no stemi, nonspecific changes, initial trop negative.   patient complaining of persisting pain in the ER. repeat EKGs stable. heart score 4, moderate risk for acs, will admit for further workup    Based on patient's HPI as well as the results of today's workup, I felt that patient warranted admission to the hospital for further workup/evaluation and continued management. I discussed the findings of today's workup with the patient and addressed the patient's questions and concerns. The patient was agreeable with admission. I spoke with the hospitalist who accepted the patient for admission and subsequently took over the patient's care.

## 2018-09-07 NOTE — ED ADULT NURSE NOTE - NSIMPLEMENTINTERV_GEN_ALL_ED
Implemented All Universal Safety Interventions:  [place] to call system. Call bell, personal items and telephone within reach. Instruct patient to call for assistance. Room bathroom lighting operational. Non-slip footwear when patient is off stretcher. Physically safe environment: no spills, clutter or unnecessary equipment. Stretcher in lowest position, wheels locked, appropriate side rails in place.

## 2018-09-07 NOTE — ED CLERICAL - NS ED CLERK NOTE PRE-ARRIVAL INFORMATION; ADDITIONAL PRE-ARRIVAL INFORMATION
This patient is enrolled in the comprehensive joint replacement (CJR) program and has active care navigation.  This patient can be followed up by the care navigation team within 24 hours.

## 2018-09-07 NOTE — ED PROVIDER NOTE - OBJECTIVE STATEMENT
89 yo F pmh afib (on xarelto and metoprolol), HLD,  L sided weakness 2/2 to R sided CVA (R PCA occlusion on CTA, right thalamocapsular acute infarct) s/p recent tpa & micu 87 yo F pmh afib (on xarelto and metoprolol), HLD,  L sided weakness 2/2 to R sided CVA (R PCA occlusion on CTA, right thalamocapsular acute infarct) s/p recent tpa p/w CP x2days, new onset, constant. cp started while pt was adm at [facility] for cva, worse w/ stretching of arm, worse w/ palpation, unknown if exertional. fell 1 wk when dx w/ cva & suffered R posterior chest contusions. ROS negative for: fever, SOB, Nausea, vomiting, diarrhea, abdominal pain, dysuria, cough, le edema, calf pain, hx dvt/pe  pmd: stef cooper

## 2018-09-07 NOTE — H&P ADULT - PROBLEM SELECTOR PLAN 3
Pt with hx of R PCA occlusion on CTA, right thalamocapsular infarct s/p tpa  C/w lipitor 80mg and xarelto   PT eval

## 2018-09-07 NOTE — H&P ADULT - PROBLEM SELECTOR PLAN 1
Pt presenting with atypical chest pain, left sided chest wall and left axillary region is tender to palpation, bruising over left axillary area   Troponins x2 negative   EKG sinus bradycardia @ 56 bpm  Likely musculoskeletal pain from prior fall/ trauma  Pt with left shoulder pain, decreased range of motion, check left shoulder xray   Pain control with tylenol and lidocaine patch   PT eval

## 2018-09-07 NOTE — ED PROVIDER NOTE - MEDICAL DECISION MAKING DETAILS
-initial impression: symptoms likely due to MSK pain/contusion. will get trop. pt on xarelto  -initial plan: labs, EKG, CXR, symptomatic treatment w/ analgesics, -initial plan: labs, EKG, CXR, symptomatic treatment w/ analgesics,

## 2018-09-07 NOTE — H&P ADULT - HISTORY OF PRESENT ILLNESS
88 year old female with hx of AFib on xarelto, HLD, anxiety, dementia, s/p 9/2018 admission at [facility] for R PCA occlusion on CTA, right thalamocapsular infarct s/p tpa, discharged to [facility] presents with 2 day history of chest pain. Pt accompanied by son at bedside, corroborating history. Reports left axillary region and left sided chest wall pain, intermittent, present for 2 days, area is tender to palpation. Also reports left shoulder pain with decreased range of motion and decreased strength LUE. Denies shortness of breath, palpitations, dizziness, headache, syncope. Denies fevers, chills, dysuria, last bowel movement few days ago.

## 2018-09-07 NOTE — H&P ADULT - NEGATIVE CARDIOVASCULAR SYMPTOMS
no dyspnea on exertion/no palpitations/no claudication/no paroxysmal nocturnal dyspnea/no orthopnea/no peripheral edema

## 2018-09-08 ENCOUNTER — TRANSCRIPTION ENCOUNTER (OUTPATIENT)
Age: 83
End: 2018-09-08

## 2018-09-08 VITALS
SYSTOLIC BLOOD PRESSURE: 154 MMHG | HEART RATE: 55 BPM | TEMPERATURE: 98 F | RESPIRATION RATE: 18 BRPM | OXYGEN SATURATION: 94 % | DIASTOLIC BLOOD PRESSURE: 82 MMHG

## 2018-09-08 DIAGNOSIS — R07.89 OTHER CHEST PAIN: ICD-10-CM

## 2018-09-08 LAB
ALBUMIN SERPL ELPH-MCNC: 4 G/DL — SIGNIFICANT CHANGE UP (ref 3.3–5)
ALP SERPL-CCNC: 67 U/L — SIGNIFICANT CHANGE UP (ref 40–120)
ALT FLD-CCNC: 32 U/L — SIGNIFICANT CHANGE UP (ref 10–45)
ANION GAP SERPL CALC-SCNC: 12 MMOL/L — SIGNIFICANT CHANGE UP (ref 5–17)
AST SERPL-CCNC: 39 U/L — SIGNIFICANT CHANGE UP (ref 10–40)
BASOPHILS # BLD AUTO: 0.1 K/UL — SIGNIFICANT CHANGE UP (ref 0–0.2)
BASOPHILS NFR BLD AUTO: 1 % — SIGNIFICANT CHANGE UP (ref 0–2)
BILIRUB SERPL-MCNC: 0.4 MG/DL — SIGNIFICANT CHANGE UP (ref 0.2–1.2)
BUN SERPL-MCNC: 41 MG/DL — HIGH (ref 7–23)
CALCIUM SERPL-MCNC: 10 MG/DL — SIGNIFICANT CHANGE UP (ref 8.4–10.5)
CHLORIDE SERPL-SCNC: 105 MMOL/L — SIGNIFICANT CHANGE UP (ref 96–108)
CHOLEST SERPL-MCNC: 192 MG/DL — SIGNIFICANT CHANGE UP (ref 10–199)
CO2 SERPL-SCNC: 25 MMOL/L — SIGNIFICANT CHANGE UP (ref 22–31)
CREAT SERPL-MCNC: 1.05 MG/DL — SIGNIFICANT CHANGE UP (ref 0.5–1.3)
EOSINOPHIL # BLD AUTO: 0.1 K/UL — SIGNIFICANT CHANGE UP (ref 0–0.5)
EOSINOPHIL NFR BLD AUTO: 2.2 % — SIGNIFICANT CHANGE UP (ref 0–6)
GLUCOSE SERPL-MCNC: 104 MG/DL — HIGH (ref 70–99)
HCT VFR BLD CALC: 42.1 % — SIGNIFICANT CHANGE UP (ref 34.5–45)
HDLC SERPL-MCNC: 62 MG/DL — SIGNIFICANT CHANGE UP
HGB BLD-MCNC: 13.8 G/DL — SIGNIFICANT CHANGE UP (ref 11.5–15.5)
LIPID PNL WITH DIRECT LDL SERPL: 112 MG/DL — SIGNIFICANT CHANGE UP
LYMPHOCYTES # BLD AUTO: 1.7 K/UL — SIGNIFICANT CHANGE UP (ref 1–3.3)
LYMPHOCYTES # BLD AUTO: 26 % — SIGNIFICANT CHANGE UP (ref 13–44)
MAGNESIUM SERPL-MCNC: 2.5 MG/DL — SIGNIFICANT CHANGE UP (ref 1.6–2.6)
MCHC RBC-ENTMCNC: 30.9 PG — SIGNIFICANT CHANGE UP (ref 27–34)
MCHC RBC-ENTMCNC: 32.9 GM/DL — SIGNIFICANT CHANGE UP (ref 32–36)
MCV RBC AUTO: 94.1 FL — SIGNIFICANT CHANGE UP (ref 80–100)
MONOCYTES # BLD AUTO: 0.6 K/UL — SIGNIFICANT CHANGE UP (ref 0–0.9)
MONOCYTES NFR BLD AUTO: 9 % — SIGNIFICANT CHANGE UP (ref 2–14)
NEUTROPHILS # BLD AUTO: 4.1 K/UL — SIGNIFICANT CHANGE UP (ref 1.8–7.4)
NEUTROPHILS NFR BLD AUTO: 61.9 % — SIGNIFICANT CHANGE UP (ref 43–77)
PHOSPHATE SERPL-MCNC: 4.6 MG/DL — HIGH (ref 2.5–4.5)
PLATELET # BLD AUTO: 217 K/UL — SIGNIFICANT CHANGE UP (ref 150–400)
POTASSIUM SERPL-MCNC: 4.5 MMOL/L — SIGNIFICANT CHANGE UP (ref 3.5–5.3)
POTASSIUM SERPL-SCNC: 4.5 MMOL/L — SIGNIFICANT CHANGE UP (ref 3.5–5.3)
PROT SERPL-MCNC: 6.8 G/DL — SIGNIFICANT CHANGE UP (ref 6–8.3)
RBC # BLD: 4.48 M/UL — SIGNIFICANT CHANGE UP (ref 3.8–5.2)
RBC # FLD: 13.3 % — SIGNIFICANT CHANGE UP (ref 10.3–14.5)
SODIUM SERPL-SCNC: 142 MMOL/L — SIGNIFICANT CHANGE UP (ref 135–145)
TOTAL CHOLESTEROL/HDL RATIO MEASUREMENT: 3.1 RATIO — LOW (ref 3.3–7.1)
TRIGL SERPL-MCNC: 91 MG/DL — SIGNIFICANT CHANGE UP (ref 10–149)
TSH SERPL-MCNC: 5.22 UIU/ML — HIGH (ref 0.27–4.2)
WBC # BLD: 6.6 K/UL — SIGNIFICANT CHANGE UP (ref 3.8–10.5)
WBC # FLD AUTO: 6.6 K/UL — SIGNIFICANT CHANGE UP (ref 3.8–10.5)

## 2018-09-08 PROCEDURE — 93005 ELECTROCARDIOGRAM TRACING: CPT

## 2018-09-08 PROCEDURE — 71045 X-RAY EXAM CHEST 1 VIEW: CPT

## 2018-09-08 PROCEDURE — 99285 EMERGENCY DEPT VISIT HI MDM: CPT | Mod: 25

## 2018-09-08 PROCEDURE — 80061 LIPID PANEL: CPT

## 2018-09-08 PROCEDURE — 84295 ASSAY OF SERUM SODIUM: CPT

## 2018-09-08 PROCEDURE — 84484 ASSAY OF TROPONIN QUANT: CPT

## 2018-09-08 PROCEDURE — 82330 ASSAY OF CALCIUM: CPT

## 2018-09-08 PROCEDURE — 83880 ASSAY OF NATRIURETIC PEPTIDE: CPT

## 2018-09-08 PROCEDURE — 82550 ASSAY OF CK (CPK): CPT

## 2018-09-08 PROCEDURE — 85730 THROMBOPLASTIN TIME PARTIAL: CPT

## 2018-09-08 PROCEDURE — 90686 IIV4 VACC NO PRSV 0.5 ML IM: CPT

## 2018-09-08 PROCEDURE — 84100 ASSAY OF PHOSPHORUS: CPT

## 2018-09-08 PROCEDURE — 85027 COMPLETE CBC AUTOMATED: CPT

## 2018-09-08 PROCEDURE — 84132 ASSAY OF SERUM POTASSIUM: CPT

## 2018-09-08 PROCEDURE — 73030 X-RAY EXAM OF SHOULDER: CPT

## 2018-09-08 PROCEDURE — 84443 ASSAY THYROID STIM HORMONE: CPT

## 2018-09-08 PROCEDURE — 36415 COLL VENOUS BLD VENIPUNCTURE: CPT

## 2018-09-08 PROCEDURE — 82947 ASSAY GLUCOSE BLOOD QUANT: CPT

## 2018-09-08 PROCEDURE — 83690 ASSAY OF LIPASE: CPT

## 2018-09-08 PROCEDURE — 85610 PROTHROMBIN TIME: CPT

## 2018-09-08 PROCEDURE — 82803 BLOOD GASES ANY COMBINATION: CPT

## 2018-09-08 PROCEDURE — 83605 ASSAY OF LACTIC ACID: CPT

## 2018-09-08 PROCEDURE — 82553 CREATINE MB FRACTION: CPT

## 2018-09-08 PROCEDURE — 85014 HEMATOCRIT: CPT

## 2018-09-08 PROCEDURE — 82435 ASSAY OF BLOOD CHLORIDE: CPT

## 2018-09-08 PROCEDURE — 83735 ASSAY OF MAGNESIUM: CPT

## 2018-09-08 PROCEDURE — 80053 COMPREHEN METABOLIC PANEL: CPT

## 2018-09-08 RX ORDER — INFLUENZA VIRUS VACCINE 15; 15; 15; 15 UG/.5ML; UG/.5ML; UG/.5ML; UG/.5ML
0.5 SUSPENSION INTRAMUSCULAR ONCE
Qty: 0 | Refills: 0 | Status: COMPLETED | OUTPATIENT
Start: 2018-09-08 | End: 2018-09-08

## 2018-09-08 RX ADMIN — Medication 975 MILLIGRAM(S): at 09:15

## 2018-09-08 RX ADMIN — INFLUENZA VIRUS VACCINE 0.5 MILLILITER(S): 15; 15; 15; 15 SUSPENSION INTRAMUSCULAR at 13:15

## 2018-09-08 RX ADMIN — POLYETHYLENE GLYCOL 3350 17 GRAM(S): 17 POWDER, FOR SOLUTION ORAL at 06:02

## 2018-09-08 RX ADMIN — Medication 100 MILLIGRAM(S): at 06:03

## 2018-09-08 RX ADMIN — Medication 10 MILLIGRAM(S): at 12:26

## 2018-09-08 RX ADMIN — Medication 25 MILLIGRAM(S): at 09:15

## 2018-09-08 RX ADMIN — AMIODARONE HYDROCHLORIDE 200 MILLIGRAM(S): 400 TABLET ORAL at 12:26

## 2018-09-08 RX ADMIN — LIDOCAINE 1 PATCH: 4 CREAM TOPICAL at 12:23

## 2018-09-08 RX ADMIN — Medication 975 MILLIGRAM(S): at 11:56

## 2018-09-08 NOTE — PROVIDER CONTACT NOTE (OTHER) - ASSESSMENT
A&O x4, forgetful.  /84. HR 57.  Patient was asleep and asymptomatic and denies pain/discomfort.  Patient is Sinus abigail, HR 49 to 50s on tele monitor

## 2018-09-08 NOTE — PROVIDER CONTACT NOTE (OTHER) - ASSESSMENT
A&O x4, forgetful.  157/64, HR 55, temp 97.6, RR 18, 95% O2 saturation on room air.  HR 50s, sinus bradycardia on tele monitor.  Patient asleep & asymptomatic

## 2018-09-08 NOTE — CONSULT NOTE ADULT - PROBLEM SELECTOR RECOMMENDATION 9
-  -Patient presents with chest wall pain  - CE (-) and EKG without ischemic changes  - Appears to be secondary to musculoskeletal etiology  - No further inpt cardiac w/u at this time  - Stable for D/C back to rehab from cardiac standpoint

## 2018-09-08 NOTE — CONSULT NOTE ADULT - SUBJECTIVE AND OBJECTIVE BOX
Cardiology Dr Walters [phone]    Patient is a 88y old  Female who presents with a chief complaint of Chest pain (08 Sep 2018 09:23)       HPI:  88 year old female with hx of AFib on xarelto, HLD, anxiety, dementia, s/p 9/2018 admission at [facility] for R PCA occlusion on CTA, right thalamocapsular infarct s/p tpa, discharged to [facility] rehab presents with chest pain. Pt had fall after CVA with large left sided hematoma. she has been having pain on and off since then. After starting rehab thursday she had acute worseing of pain radiating to shoulder and left sided chest. patient denies CP put upon palpating left chest wall c/o pain    PAST MEDICAL & SURGICAL HISTORY:  Anxiety  Atrial fibrillation  Hyperlipidemia  Familial tremor  No significant past surgical history      MEDICATIONS  (STANDING):  acetaminophen   Tablet .. 975 milliGRAM(s) Oral every 12 hours  amiodarone    Tablet 200 milliGRAM(s) Oral daily  atorvastatin 80 milliGRAM(s) Oral at bedtime  docusate sodium 100 milliGRAM(s) Oral two times a day  donepezil 10 milliGRAM(s) Oral at bedtime  lidocaine   Patch 1 Patch Transdermal daily  melatonin 3 milliGRAM(s) Oral at bedtime  metoprolol tartrate 25 milliGRAM(s) Oral two times a day  mirtazapine 15 milliGRAM(s) Oral at bedtime  PARoxetine 10 milliGRAM(s) Oral daily  polyethylene glycol 3350 17 Gram(s) Oral daily  rivaroxaban 15 milliGRAM(s) Oral every 24 hours  senna 2 Tablet(s) Oral at bedtime    MEDICATIONS  (PRN):      Allergies    No Known Allergies    Intolerances        Social Histroy: Tobacco-none, ETOH-none, Illicit Drugs-none    T(C): 36.4 (09-08-18 @ 03:57), Max: 36.7 (09-07-18 @ 13:26)  HR: 58 (09-08-18 @ 06:00) (50 - 59)  BP: 161/61 (09-08-18 @ 06:00) (119/71 - 161/61)  RR: 18 (09-08-18 @ 03:57) (16 - 18)  SpO2: 96% (09-08-18 @ 06:00) (95% - 100%)  Wt(kg): --  Daily Height in cm: 157.48 (07 Sep 2018 19:26)    Daily   I&O's Summary    07 Sep 2018 07:01  -  08 Sep 2018 07:00  --------------------------------------------------------  IN: 240 mL / OUT: 0 mL / NET: 240 mL        Review of Systems:  Constitutional: [ ] Fever [ ] Chills [ ] Fatigue [ ] Weight change   HEENT: [ ] Blurred vision [ ] Eye Pain [ ] Headache [ ] Runny nose [ ] Sore Throat   Respiratory: [ ] Cough [ ] Wheezing [ ] Shortness of breath  Cardiovascular: [x ] Chest Pain [ ] Palpitations [ ] ALSTON [ ] PND [ ] Orthopnea  Gastrointestinal: [ ] Abdominal Pain [ ] Diarrhea [ ] Constipation [ ] Hemorrhoids [ ] Nausea [ ] Vomiting  Genitourinary: [ ] Nocturia [ ] Dysuria [ ] Incontinence  Extremities: [ ] Swelling [ ] Joint Pain  Neurologic: [x ] Focal deficit [ ] Paresthesias [ ] Syncope  Lymphatic: [ ] Swelling [ ] Lymphadenopathy   Skin: [ ] Rash [ ] Ecchymoses [ ] Wounds [ ] Lesions  Psychiatry: [ ] Depression [ ] Suicidal/Homicidal Ideation [ ] Anxiety [ ] Sleep Disturbances  [x ] 10 point review of systems is otherwise negative except as mentioned above            [ ]Unable to obtain    PHYSICAL EXAM:  GENERAL: A+O x 3, NAD  HEAD:  Atraumatic, Normocephali  NECK: Supple, No JVD  CHEST/LUNG: Clear to auscultation bilaterally; No wheeze, rhonchi or rales  HEART: S1S2, RR,  No murmurs, rubs, or gallops  ABDOMEN: Soft, Nontender, Nondistended; Bowel sounds present  EXTREMITIES:  2+ Peripheral Pulses, No clubbing, cyanosis, or edema  NEUROLOGY: non-focal      LABS:                        13.8   6.6   )-----------( 217      ( 08 Sep 2018 07:02 )             42.1     09-08    142  |  105  |  41<H>  ----------------------------<  104<H>  4.5   |  25  |  1.05    Ca    10.0      08 Sep 2018 07:02  Phos  4.6     09-08  Mg     2.5     09-08    TPro  6.8  /  Alb  4.0  /  TBili  0.4  /  DBili  x   /  AST  39  /  ALT  32  /  AlkPhos  67  09-08    PT/INR - ( 07 Sep 2018 14:37 )   PT: 23.0 sec;   INR: 2.10 ratio         PTT - ( 07 Sep 2018 14:37 )  PTT:38.5 sec  CARDIAC MARKERS ( 07 Sep 2018 14:37 )  x     / x     / 48 U/L / x     / 1.4 ng/mL            RADIOLOGY & ADDITIONAL TESTS:    Cardiology testing:  ECG:    Echo: SB without ischemic changes    Stress Testing:    Cath:    Telemetry: SB

## 2018-09-08 NOTE — DISCHARGE NOTE ADULT - ADDITIONAL INSTRUCTIONS
-Follow with your Primary care Physician after Rehab review your hospital course  -Bring all discharge documents and prescriptions with you at the time of your appointments   -You may return to the Emergency department for any worsening or return of your symptoms

## 2018-09-08 NOTE — DISCHARGE NOTE ADULT - PATIENT PORTAL LINK FT
You can access the [facility] Patient Portal, offered by [facility], by registering with the following website: [email or web address]

## 2018-09-08 NOTE — DISCHARGE NOTE ADULT - CARE PROVIDERS DIRECT ADDRESSES
[email or web address]

## 2018-09-08 NOTE — PROGRESS NOTE ADULT - ASSESSMENT
88 year old female with hx of AFib on xarelto, HLD, anxiety, dementia, s/p 9/2018 admission at [facility] for R PCA occlusion on CTA, right thalamocapsular infarct s/p tpa, discharged [email or web address] presents with 2 day history of chest pain

## 2018-09-08 NOTE — DISCHARGE NOTE ADULT - MEDICATION SUMMARY - MEDICATIONS TO TAKE
I will START or STAY ON the medications listed below when I get home from the hospital:    acetaminophen 325 mg oral tablet  -- 2 tab(s) by mouth every 6 hours, As needed, mild, moderate, severe pain  -- Indication: For Acute pain of left shoulder    amiodarone 200 mg oral tablet  -- 1 tab(s) by mouth once a day  -- Indication: For Atrial fibrillation    rivaroxaban 15 mg oral tablet  -- 1 tab(s) by mouth every 24 hours  -- Indication: For Atrial fibrillation    PARoxetine 10 mg oral tablet  -- 1 tab(s) by mouth once a day  -- Indication: For Dementia    mirtazapine 15 mg oral tablet  -- 1 tab(s) by mouth once a day (at bedtime)  -- Indication: For sleep     atorvastatin 80 mg oral tablet  -- 1 tab(s) by mouth once a day (at bedtime)  -- Indication: For Hyperlipidemia    metoprolol tartrate 25 mg oral tablet  -- 1 tab(s) by mouth 2 times a day  -- Indication: For Chronic atrial fibrillation    donepezil 10 mg oral tablet  -- 1 tab(s) by mouth once a day (at bedtime)  -- Indication: For Dementia    polyethylene glycol 3350 oral powder for reconstitution  -- 17 gram(s) by mouth once a day, As Needed  -- Indication: For Constipation     senna oral tablet  -- 2 tab(s) by mouth once a day (at bedtime), As Needed  -- Indication: For Constipation     melatonin 3 mg oral tablet  -- 2 tab(s) by mouth once a day (at bedtime)  -- Indication: For sleep

## 2018-09-08 NOTE — PROGRESS NOTE ADULT - PROBLEM SELECTOR PLAN 1
Pt presenting with atypical chest pain, left sided chest wall and left axillary region is tender to palpation, bruising over left axillary area   Troponins x2 negative   EKG sinus bradycardia @ 56 bpm  Likely musculoskeletal pain from prior fall/ trauma  Pt with left shoulder pain, decreased range of motion, check left shoulder xray   Pain control with tylenol and lidocaine patch   PT eval  appreciate cards

## 2018-09-08 NOTE — DISCHARGE NOTE ADULT - CARE PROVIDER_API CALL
Andreia Lakhani (MD), Cardiovascular Disease; Internal Medicine  [address] [place] [address]  Phone: [phone]  Fax: [phone] Andreia Lakhani), Cardiovascular Disease; Internal Medicine  [address] [facility]  [address]  [place] [address]  Phone: [phone]  Fax: [phone]    Libman, Richard B (MD), Neurology; Vascular Neurology  [address] [facility]  [address]  [place] [address]  Phone: [phone]  Fax: [phone]

## 2018-09-08 NOTE — PROVIDER CONTACT NOTE (OTHER) - ACTION/TREATMENT ORDERED:
NP notified and aware.  No new orders at this time.  Will continue to monitor patient and maintain safety
NP notified and aware.  Reschedule AM dose of metoprolol to later time.  Continue to monitor patient and maintain safety

## 2018-09-08 NOTE — PROGRESS NOTE ADULT - SUBJECTIVE AND OBJECTIVE BOX
Patient is a 88y old  Female who presents with a chief complaint of Chest pain (07 Sep 2018 18:18)      SUBJECTIVE / OVERNIGHT EVENTS:    comfortable  no recurrence of chest discomfrot    Vital Signs Last 24 Hrs  T(C): 36.4 (08 Sep 2018 03:57), Max: 36.7 (07 Sep 2018 13:26)  T(F): 97.6 (08 Sep 2018 03:57), Max: 98 (07 Sep 2018 13:26)  HR: 58 (08 Sep 2018 06:00) (50 - 59)  BP: 161/61 (08 Sep 2018 06:00) (119/71 - 161/61)  BP(mean): --  RR: 18 (08 Sep 2018 03:57) (16 - 18)  SpO2: 96% (08 Sep 2018 06:00) (95% - 100%)  I&O's Summary    07 Sep 2018 07:01  -  08 Sep 2018 07:00  --------------------------------------------------------  IN: 240 mL / OUT: 0 mL / NET: 240 mL        GENERAL: NAD, AAOx3  HEAD:  Atraumatic, Normocephalic  EYES: EOMI, PERRLA, conjunctiva and sclera clear  NECK: Supple, No JVD, No LAD  CHEST/LUNG: Clear to auscultation bilaterally; lt posterior chest wall/axilla tender with movement/palpation  HEART: Regular rate and rhythm; No murmurs, rubs, or gallops  ABDOMEN: Soft, Nontender, Nondistended; Bowel sounds present  EXTREMITIES:  2+ Peripheral Pulses, No clubbing, cyanosis, or edema  SKIN: bruising left axillary and left forearm  NEURO: nonfocal CN/sensory/reflexes; lt UE/LE 2/5 strength    LABS:                        13.8   6.6   )-----------( 217      ( 08 Sep 2018 07:02 )             42.1     09-08    142  |  105  |  41<H>  ----------------------------<  104<H>  4.5   |  25  |  1.05    Ca    10.0      08 Sep 2018 07:02  Phos  4.6     09-08  Mg     2.5     09-08    TPro  6.8  /  Alb  4.0  /  TBili  0.4  /  DBili  x   /  AST  39  /  ALT  32  /  AlkPhos  67  09-08    PT/INR - ( 07 Sep 2018 14:37 )   PT: 23.0 sec;   INR: 2.10 ratio         PTT - ( 07 Sep 2018 14:37 )  PTT:38.5 sec  CAPILLARY BLOOD GLUCOSE        CARDIAC MARKERS ( 07 Sep 2018 14:37 )  x     / x     / 48 U/L / x     / 1.4 ng/mL          RADIOLOGY & ADDITIONAL TESTS:    Imaging Personally Reviewed:  [x] YES  [ ] NO    Consultant(s) Notes Reviewed:  [x] YES  [ ] NO      MEDICATIONS  (STANDING):  acetaminophen   Tablet .. 975 milliGRAM(s) Oral every 12 hours  amiodarone    Tablet 200 milliGRAM(s) Oral daily  atorvastatin 80 milliGRAM(s) Oral at bedtime  docusate sodium 100 milliGRAM(s) Oral two times a day  donepezil 10 milliGRAM(s) Oral at bedtime  lidocaine   Patch 1 Patch Transdermal daily  melatonin 3 milliGRAM(s) Oral at bedtime  metoprolol tartrate 25 milliGRAM(s) Oral two times a day  mirtazapine 15 milliGRAM(s) Oral at bedtime  PARoxetine 10 milliGRAM(s) Oral daily  polyethylene glycol 3350 17 Gram(s) Oral daily  rivaroxaban 15 milliGRAM(s) Oral every 24 hours  senna 2 Tablet(s) Oral at bedtime    MEDICATIONS  (PRN):      Care Discussed with Consultants/Other Providers [x] YES  [ ] NO    HEALTH ISSUES - PROBLEM Dx:  Prophylactic measure: Prophylactic measure  Hyperlipidemia: Hyperlipidemia  Dementia: Dementia  CVA (cerebral vascular accident): CVA (cerebral vascular accident)  Atrial fibrillation: Atrial fibrillation  Chest pain: Chest pain

## 2018-09-08 NOTE — DISCHARGE NOTE ADULT - CARE PLAN
Principal Discharge DX:	Other chest pain  Goal:	resolved, to remain free of CP and without further complications  Assessment and plan of treatment:	CP likely musculoskeletal in Etiology   - You had L shoulder Xray revealing  Secondary Diagnosis:	Fall, sequela  Assessment and plan of treatment:	RY bed for strengthening, balance, and Rehabilitation needs   Fall precautions  Secondary Diagnosis:	Chronic atrial fibrillation  Assessment and plan of treatment:	Continue amiodarone and your rate controlling agents    Xarelto/Rivaroxaban is used to thin the blood so clots will not form and to keep existing ones from getting bigger.  Take this medication daily as prescribed by your health care provider.  Take this medication with food to prevent upset stomach.  If you miss a dose call your health care provider or pharmacist right away.  Tell your doctor you use this drug before you have a spinal or epidural procedure  Tell dentists, surgeon, and other doctors that you use this drug.  You may bleed more easily.  Be careful and avoid injury.  Use a soft toothbrush and an electric razor.  Secondary Diagnosis:	Acute pain of left shoulder  Assessment and plan of treatment:	You had L shoulder Xray revealing  pain control with tylenol as needed Principal Discharge DX:	Other chest pain  Goal:	resolved, to remain free of CP and without further complications  Assessment and plan of treatment:	CP likely musculoskeletal in Etiology   - You had L shoulder Xray revealing no fracture.  -Pain control  -Rehab bed  Secondary Diagnosis:	Fall, sequela  Assessment and plan of treatment:	RY bed for strengthening, balance, and Rehabilitation needs   Fall precautions  Secondary Diagnosis:	Chronic atrial fibrillation  Assessment and plan of treatment:	Continue amiodarone and your rate controlling agents    Xarelto/Rivaroxaban is used to thin the blood so clots will not form and to keep existing ones from getting bigger.  Take this medication daily as prescribed by your health care provider.  Take this medication with food to prevent upset stomach.  If you miss a dose call your health care provider or pharmacist right away.  Tell your doctor you use this drug before you have a spinal or epidural procedure  Tell dentists, surgeon, and other doctors that you use this drug.  You may bleed more easily.  Be careful and avoid injury.  Use a soft toothbrush and an electric razor.  Secondary Diagnosis:	Acute pain of left shoulder  Assessment and plan of treatment:	You had L shoulder Xray revealing  pain control with Tylenol as needed

## 2018-09-08 NOTE — CONSULT NOTE ADULT - ASSESSMENT
88 year old female with hx of AFib on xarelto, HLD, anxiety, dementia, s/p 9/2018 admission at [facility] for R PCA occlusion on CTA, right thalamocapsular infarct s/p tpa, discharged to [facility] rehab presents with atypical chest pain

## 2018-09-08 NOTE — DISCHARGE NOTE ADULT - HOSPITAL COURSE
88 year old female with hx of AFib on xarelto, HLD, anxiety, dementia, s/p 9/2018 admission at [facility] for R PCA occlusion on CTA, right thalamocapsular infarct s/p tpa, discharged to [facility] rehab presents with chest pain. Pt had fall after CVA with large left sided hematoma. she has been having pain on and off since then. After starting rehab thursday she had acute worsening of pain radiating to shoulder and left sided chest. CE (-) and EKG without ischemic changes. Cards evaluated and felt CP appears to be secondary to musculoskeletal etiology. No further inpt cardiac w/u at this time. LT Shoulder Xray performed and resulted in               . Pt was deemed medically stable and cleared for discharge to [facility] by medical attending Dr Boykin 88 year old female with hx of AFib on xarelto, HLD, anxiety, dementia, s/p 9/2018 admission at [facility] for R PCA occlusion on CTA, right thalamocapsular infarct s/p tpa, discharged to [facility] rehab presents with chest pain. Pt had fall after CVA with large left sided hematoma. she has been having pain on and off since then. After starting rehab thursday she had acute worsening of pain radiating to shoulder and left sided chest. CE (-) and EKG without ischemic changes. Cards evaluated and felt CP appears to be secondary to musculoskeletal etiology. No further inpt cardiac w/u at this time. LT Shoulder Xray performed and resulted in no fracture. Pt was deemed medically stable and cleared for discharge to [facility] by medical attending Dr Boykin.

## 2018-09-08 NOTE — DISCHARGE NOTE ADULT - CONDITIONS AT DISCHARGE
OSDistance Patient PIVL removed. no  evidence of infiltration noted at discharge. Patient skin intact, vital signs stable, Patient with no complaints of SOB, or chest pain at discharge Pt discharged to [facility] as per MD orders in stable condition

## 2018-09-08 NOTE — DISCHARGE NOTE ADULT - PLAN OF CARE
resolved, to remain free of CP and without further complications CP likely musculoskeletal in Etiology   - You had L shoulder Xray revealing RY bed for strengthening, balance, and Rehabilitation needs   Fall precautions Continue amiodarone and your rate controlling agents    Xarelto/Rivaroxaban is used to thin the blood so clots will not form and to keep existing ones from getting bigger.  Take this medication daily as prescribed by your health care provider.  Take this medication with food to prevent upset stomach.  If you miss a dose call your health care provider or pharmacist right away.  Tell your doctor you use this drug before you have a spinal or epidural procedure  Tell dentists, surgeon, and other doctors that you use this drug.  You may bleed more easily.  Be careful and avoid injury.  Use a soft toothbrush and an electric razor. You had L shoulder Xray revealing  pain control with tylenol as needed CP likely musculoskeletal in Etiology   - You had L shoulder Xray revealing no fracture.  -Pain control  -Rehab bed You had L shoulder Xray revealing  pain control with Tylenol as needed

## 2018-09-11 RX ORDER — LIDOCAINE HCL 4 %
4 LIQUID ROLL-ON (ML) TOPICAL
Refills: 0 | Status: ACTIVE | COMMUNITY
Start: 2018-09-11

## 2018-09-11 RX ORDER — SENNOSIDES 8.6 MG TABLETS 8.6 MG/1
8.6 TABLET ORAL AT BEDTIME
Refills: 0 | Status: ACTIVE | COMMUNITY
Start: 2018-09-11

## 2018-09-11 RX ORDER — METOPROLOL TARTRATE 25 MG/1
25 TABLET, FILM COATED ORAL TWICE DAILY
Refills: 0 | Status: ACTIVE | COMMUNITY
Start: 2018-09-11

## 2018-09-11 RX ORDER — GLUCOSAMINE HCL/CHONDROITIN SU 500-400 MG
3 CAPSULE ORAL AT BEDTIME
Refills: 0 | Status: ACTIVE | COMMUNITY
Start: 2018-09-11

## 2018-09-11 RX ORDER — ACETAMINOPHEN 325 MG/1
325 TABLET ORAL EVERY 6 HOURS
Refills: 0 | Status: ACTIVE | COMMUNITY
Start: 2018-09-11

## 2018-09-11 RX ORDER — RIVAROXABAN 15 MG/1
15 TABLET, FILM COATED ORAL
Refills: 0 | Status: ACTIVE | COMMUNITY
Start: 2018-09-11

## 2018-09-11 RX ORDER — MIRTAZAPINE 15 MG/1
15 TABLET, FILM COATED ORAL
Refills: 0 | Status: ACTIVE | COMMUNITY
Start: 2018-09-11

## 2018-09-11 RX ORDER — ZINC OXIDE AND DIMETHICONE 120; 10 MG/G; MG/G
CREAM TOPICAL
Refills: 0 | Status: ACTIVE | COMMUNITY
Start: 2018-09-11

## 2018-09-11 RX ORDER — ATORVASTATIN CALCIUM 80 MG/1
80 TABLET, FILM COATED ORAL
Refills: 0 | Status: ACTIVE | COMMUNITY
Start: 2018-09-11

## 2018-09-11 RX ORDER — POLYETHYLENE GLYCOL 3350 17 G/17G
17 POWDER, FOR SOLUTION ORAL DAILY
Refills: 0 | Status: ACTIVE | COMMUNITY
Start: 2018-09-11

## 2018-09-11 RX ORDER — MEMANTINE HYDROCHLORIDE 10 MG/1
10 TABLET ORAL
Refills: 0 | Status: ACTIVE | COMMUNITY
Start: 2018-09-11

## 2018-09-11 RX ORDER — PAROXETINE HYDROCHLORIDE 10 MG/1
10 TABLET, FILM COATED ORAL DAILY
Refills: 0 | Status: ACTIVE | COMMUNITY
Start: 2018-09-11

## 2018-09-11 RX ORDER — DONEPEZIL HYDROCHLORIDE 10 MG/1
10 TABLET ORAL AT BEDTIME
Refills: 0 | Status: ACTIVE | COMMUNITY
Start: 2018-09-11

## 2019-03-17 ENCOUNTER — RECORD ABSTRACTING (OUTPATIENT)
Age: 84
End: 2019-03-17

## 2019-03-17 NOTE — HISTORY OF PRESENT ILLNESS
[FreeTextEntry1] : 89-year-old lady\par  Summary from [facility] progress note dated 9/4/18-with modification\par She Has a PMH of Dementia, A-Fib (on Xarelto) HLD and sudden onset in late 8/18 of left hemiparesis and left facial droop (left ataxic hemiparesis). NIHSS 7 MRS 0. CT Head showed no hemorrhage. tPA bolus given (at the time, it was not known that she was on Xarelto). CTA Head/Neck shows R P2 occlusion. MRI showing an acute right thalamo-capsular infarction likely due to cardio-embolism in the setting of A-Fib.\par \par 4/11/19.\par

## 2019-03-17 NOTE — DISCUSSION/SUMMARY
[FreeTextEntry1] : Summary from [facility] progress note dated 9/4/18-with modification\par She Has a PMH of A-Fib (on Xarelto)  and sudden onset in late 8/18 of left  ataxic hemiparesis, due to a right thalamocapsular infarct, with a right P2 occlusion, most likely due to cardioembolism related to atrial fibrillation.\par \par 4/11/19.

## 2019-04-11 ENCOUNTER — APPOINTMENT (OUTPATIENT)
Dept: NEUROLOGY | Facility: CLINIC | Age: 84
End: 2019-04-11

## 2019-05-02 ENCOUNTER — APPOINTMENT (OUTPATIENT)
Dept: NEUROLOGY | Facility: CLINIC | Age: 84
End: 2019-05-02

## 2020-04-19 ENCOUNTER — INPATIENT (INPATIENT)
Facility: HOSPITAL | Age: 85
LOS: 0 days | DRG: 871 | End: 2020-04-20
Attending: STUDENT IN AN ORGANIZED HEALTH CARE EDUCATION/TRAINING PROGRAM | Admitting: STUDENT IN AN ORGANIZED HEALTH CARE EDUCATION/TRAINING PROGRAM
Payer: MEDICARE

## 2020-04-19 VITALS
HEART RATE: 126 BPM | SYSTOLIC BLOOD PRESSURE: 130 MMHG | RESPIRATION RATE: 22 BRPM | DIASTOLIC BLOOD PRESSURE: 80 MMHG | TEMPERATURE: 99 F | OXYGEN SATURATION: 81 %

## 2020-04-19 DIAGNOSIS — J96.01 ACUTE RESPIRATORY FAILURE WITH HYPOXIA: ICD-10-CM

## 2020-04-19 DIAGNOSIS — Z71.89 OTHER SPECIFIED COUNSELING: ICD-10-CM

## 2020-04-19 DIAGNOSIS — I48.19 OTHER PERSISTENT ATRIAL FIBRILLATION: ICD-10-CM

## 2020-04-19 DIAGNOSIS — F03.90 UNSPECIFIED DEMENTIA WITHOUT BEHAVIORAL DISTURBANCE: ICD-10-CM

## 2020-04-19 DIAGNOSIS — I63.89 OTHER CEREBRAL INFARCTION: ICD-10-CM

## 2020-04-19 DIAGNOSIS — A41.9 SEPSIS, UNSPECIFIED ORGANISM: ICD-10-CM

## 2020-04-19 DIAGNOSIS — R68.89 OTHER GENERAL SYMPTOMS AND SIGNS: ICD-10-CM

## 2020-04-19 LAB
ALBUMIN SERPL ELPH-MCNC: 3.5 G/DL — SIGNIFICANT CHANGE UP (ref 3.3–5)
ALP SERPL-CCNC: 87 U/L — SIGNIFICANT CHANGE UP (ref 40–120)
ALT FLD-CCNC: 244 U/L — HIGH (ref 10–45)
ANION GAP SERPL CALC-SCNC: 18 MMOL/L — HIGH (ref 5–17)
APPEARANCE UR: ABNORMAL
AST SERPL-CCNC: 188 U/L — HIGH (ref 10–40)
BACTERIA # UR AUTO: NEGATIVE — SIGNIFICANT CHANGE UP
BASOPHILS # BLD AUTO: 0.01 K/UL — SIGNIFICANT CHANGE UP (ref 0–0.2)
BASOPHILS NFR BLD AUTO: 0.1 % — SIGNIFICANT CHANGE UP (ref 0–2)
BILIRUB SERPL-MCNC: 0.6 MG/DL — SIGNIFICANT CHANGE UP (ref 0.2–1.2)
BILIRUB UR-MCNC: NEGATIVE — SIGNIFICANT CHANGE UP
BUN SERPL-MCNC: 18 MG/DL — SIGNIFICANT CHANGE UP (ref 7–23)
CALCIUM SERPL-MCNC: 9.4 MG/DL — SIGNIFICANT CHANGE UP (ref 8.4–10.5)
CHLORIDE SERPL-SCNC: 102 MMOL/L — SIGNIFICANT CHANGE UP (ref 96–108)
CO2 SERPL-SCNC: 23 MMOL/L — SIGNIFICANT CHANGE UP (ref 22–31)
COLOR SPEC: YELLOW — SIGNIFICANT CHANGE UP
CREAT SERPL-MCNC: 0.52 MG/DL — SIGNIFICANT CHANGE UP (ref 0.5–1.3)
CRP SERPL-MCNC: 16.43 MG/DL — HIGH (ref 0–0.4)
D DIMER BLD IA.RAPID-MCNC: <150 NG/ML DDU — SIGNIFICANT CHANGE UP
DIFF PNL FLD: ABNORMAL
EOSINOPHIL # BLD AUTO: 0 K/UL — SIGNIFICANT CHANGE UP (ref 0–0.5)
EOSINOPHIL NFR BLD AUTO: 0 % — SIGNIFICANT CHANGE UP (ref 0–6)
EPI CELLS # UR: 3 /HPF — SIGNIFICANT CHANGE UP
FERRITIN SERPL-MCNC: 1240 NG/ML — HIGH (ref 15–150)
FIBRINOGEN PPP-MCNC: 600 MG/DL — HIGH (ref 350–510)
GAS PNL BLDV: SIGNIFICANT CHANGE UP
GLUCOSE SERPL-MCNC: 143 MG/DL — HIGH (ref 70–99)
GLUCOSE UR QL: NEGATIVE — SIGNIFICANT CHANGE UP
HCT VFR BLD CALC: 45.2 % — HIGH (ref 34.5–45)
HGB BLD-MCNC: 14 G/DL — SIGNIFICANT CHANGE UP (ref 11.5–15.5)
HYALINE CASTS # UR AUTO: 4 /LPF — HIGH (ref 0–2)
IMM GRANULOCYTES NFR BLD AUTO: 1.2 % — SIGNIFICANT CHANGE UP (ref 0–1.5)
KETONES UR-MCNC: ABNORMAL
LDH SERPL L TO P-CCNC: 655 U/L — HIGH (ref 50–242)
LEUKOCYTE ESTERASE UR-ACNC: NEGATIVE — SIGNIFICANT CHANGE UP
LYMPHOCYTES # BLD AUTO: 0.52 K/UL — LOW (ref 1–3.3)
LYMPHOCYTES # BLD AUTO: 6.7 % — LOW (ref 13–44)
MCHC RBC-ENTMCNC: 29.9 PG — SIGNIFICANT CHANGE UP (ref 27–34)
MCHC RBC-ENTMCNC: 31 GM/DL — LOW (ref 32–36)
MCV RBC AUTO: 96.6 FL — SIGNIFICANT CHANGE UP (ref 80–100)
MONOCYTES # BLD AUTO: 0.17 K/UL — SIGNIFICANT CHANGE UP (ref 0–0.9)
MONOCYTES NFR BLD AUTO: 2.2 % — SIGNIFICANT CHANGE UP (ref 2–14)
NEUTROPHILS # BLD AUTO: 7.01 K/UL — SIGNIFICANT CHANGE UP (ref 1.8–7.4)
NEUTROPHILS NFR BLD AUTO: 89.8 % — HIGH (ref 43–77)
NITRITE UR-MCNC: NEGATIVE — SIGNIFICANT CHANGE UP
NRBC # BLD: 0 /100 WBCS — SIGNIFICANT CHANGE UP (ref 0–0)
PH UR: 6.5 — SIGNIFICANT CHANGE UP (ref 5–8)
PLATELET # BLD AUTO: 190 K/UL — SIGNIFICANT CHANGE UP (ref 150–400)
POTASSIUM SERPL-MCNC: 3.8 MMOL/L — SIGNIFICANT CHANGE UP (ref 3.5–5.3)
POTASSIUM SERPL-SCNC: 3.8 MMOL/L — SIGNIFICANT CHANGE UP (ref 3.5–5.3)
PROCALCITONIN SERPL-MCNC: 0.27 NG/ML — HIGH (ref 0.02–0.1)
PROT SERPL-MCNC: 7.4 G/DL — SIGNIFICANT CHANGE UP (ref 6–8.3)
PROT UR-MCNC: ABNORMAL
RBC # BLD: 4.68 M/UL — SIGNIFICANT CHANGE UP (ref 3.8–5.2)
RBC # FLD: 14.3 % — SIGNIFICANT CHANGE UP (ref 10.3–14.5)
RBC CASTS # UR COMP ASSIST: 1273 /HPF — HIGH (ref 0–4)
SARS-COV-2 RNA SPEC QL NAA+PROBE: DETECTED
SODIUM SERPL-SCNC: 143 MMOL/L — SIGNIFICANT CHANGE UP (ref 135–145)
SP GR SPEC: >1.05 (ref 1.01–1.02)
UROBILINOGEN FLD QL: NEGATIVE — SIGNIFICANT CHANGE UP
WBC # BLD: 7.8 K/UL — SIGNIFICANT CHANGE UP (ref 3.8–10.5)
WBC # FLD AUTO: 7.8 K/UL — SIGNIFICANT CHANGE UP (ref 3.8–10.5)
WBC UR QL: 10 /HPF — HIGH (ref 0–5)

## 2020-04-19 PROCEDURE — 99285 EMERGENCY DEPT VISIT HI MDM: CPT | Mod: CS,GC

## 2020-04-19 PROCEDURE — 99497 ADVNCD CARE PLAN 30 MIN: CPT | Mod: CS,24

## 2020-04-19 PROCEDURE — 99223 1ST HOSP IP/OBS HIGH 75: CPT | Mod: CS

## 2020-04-19 PROCEDURE — 71275 CT ANGIOGRAPHY CHEST: CPT | Mod: 26

## 2020-04-19 PROCEDURE — 71045 X-RAY EXAM CHEST 1 VIEW: CPT | Mod: 26

## 2020-04-19 PROCEDURE — 74177 CT ABD & PELVIS W/CONTRAST: CPT | Mod: 26

## 2020-04-19 PROCEDURE — 93010 ELECTROCARDIOGRAM REPORT: CPT

## 2020-04-19 RX ORDER — ONDANSETRON 8 MG/1
4 TABLET, FILM COATED ORAL ONCE
Refills: 0 | Status: COMPLETED | OUTPATIENT
Start: 2020-04-19 | End: 2020-04-19

## 2020-04-19 RX ORDER — HYDROXYCHLOROQUINE SULFATE 200 MG
400 TABLET ORAL EVERY 24 HOURS
Refills: 0 | Status: DISCONTINUED | OUTPATIENT
Start: 2020-04-20 | End: 2020-04-20

## 2020-04-19 RX ORDER — MIRTAZAPINE 45 MG/1
30 TABLET, ORALLY DISINTEGRATING ORAL AT BEDTIME
Refills: 0 | Status: DISCONTINUED | OUTPATIENT
Start: 2020-04-19 | End: 2020-04-20

## 2020-04-19 RX ORDER — HYDROXYCHLOROQUINE SULFATE 200 MG
TABLET ORAL
Refills: 0 | Status: DISCONTINUED | OUTPATIENT
Start: 2020-04-19 | End: 2020-04-20

## 2020-04-19 RX ORDER — ESCITALOPRAM OXALATE 10 MG/1
1 TABLET, FILM COATED ORAL
Qty: 0 | Refills: 0 | DISCHARGE

## 2020-04-19 RX ORDER — ALBUTEROL 90 UG/1
2 AEROSOL, METERED ORAL EVERY 4 HOURS
Refills: 0 | Status: DISCONTINUED | OUTPATIENT
Start: 2020-04-19 | End: 2020-04-20

## 2020-04-19 RX ORDER — MORPHINE SULFATE 50 MG/1
2 CAPSULE, EXTENDED RELEASE ORAL ONCE
Refills: 0 | Status: DISCONTINUED | OUTPATIENT
Start: 2020-04-19 | End: 2020-04-19

## 2020-04-19 RX ORDER — ACETAMINOPHEN 500 MG
650 TABLET ORAL ONCE
Refills: 0 | Status: DISCONTINUED | OUTPATIENT
Start: 2020-04-19 | End: 2020-04-19

## 2020-04-19 RX ORDER — ESCITALOPRAM OXALATE 10 MG/1
20 TABLET, FILM COATED ORAL DAILY
Refills: 0 | Status: DISCONTINUED | OUTPATIENT
Start: 2020-04-19 | End: 2020-04-20

## 2020-04-19 RX ORDER — HYDROXYCHLOROQUINE SULFATE 200 MG
800 TABLET ORAL EVERY 24 HOURS
Refills: 0 | Status: COMPLETED | OUTPATIENT
Start: 2020-04-19 | End: 2020-04-19

## 2020-04-19 RX ORDER — ATORVASTATIN CALCIUM 80 MG/1
40 TABLET, FILM COATED ORAL AT BEDTIME
Refills: 0 | Status: DISCONTINUED | OUTPATIENT
Start: 2020-04-19 | End: 2020-04-20

## 2020-04-19 RX ORDER — AMIODARONE HYDROCHLORIDE 400 MG/1
200 TABLET ORAL DAILY
Refills: 0 | Status: DISCONTINUED | OUTPATIENT
Start: 2020-04-19 | End: 2020-04-20

## 2020-04-19 RX ORDER — ACETAMINOPHEN 500 MG
650 TABLET ORAL EVERY 4 HOURS
Refills: 0 | Status: DISCONTINUED | OUTPATIENT
Start: 2020-04-19 | End: 2020-04-20

## 2020-04-19 RX ORDER — ACETAMINOPHEN 500 MG
750 TABLET ORAL ONCE
Refills: 0 | Status: COMPLETED | OUTPATIENT
Start: 2020-04-19 | End: 2020-04-19

## 2020-04-19 RX ORDER — RIVAROXABAN 15 MG-20MG
15 KIT ORAL
Refills: 0 | Status: DISCONTINUED | OUTPATIENT
Start: 2020-04-19 | End: 2020-04-20

## 2020-04-19 RX ORDER — SODIUM CHLORIDE 9 MG/ML
500 INJECTION INTRAMUSCULAR; INTRAVENOUS; SUBCUTANEOUS ONCE
Refills: 0 | Status: COMPLETED | OUTPATIENT
Start: 2020-04-19 | End: 2020-04-19

## 2020-04-19 RX ORDER — SODIUM CHLORIDE 9 MG/ML
1000 INJECTION INTRAMUSCULAR; INTRAVENOUS; SUBCUTANEOUS
Refills: 0 | Status: DISCONTINUED | OUTPATIENT
Start: 2020-04-19 | End: 2020-04-20

## 2020-04-19 RX ORDER — MEMANTINE HYDROCHLORIDE AND DONEPEZIL HYDROCHLORIDE 21; 10 MG/1; MG/1
1 CAPSULE ORAL
Qty: 0 | Refills: 0 | DISCHARGE

## 2020-04-19 RX ADMIN — SODIUM CHLORIDE 500 MILLILITER(S): 9 INJECTION INTRAMUSCULAR; INTRAVENOUS; SUBCUTANEOUS at 04:02

## 2020-04-19 RX ADMIN — MORPHINE SULFATE 2 MILLIGRAM(S): 50 CAPSULE, EXTENDED RELEASE ORAL at 03:53

## 2020-04-19 RX ADMIN — AMIODARONE HYDROCHLORIDE 200 MILLIGRAM(S): 400 TABLET ORAL at 15:20

## 2020-04-19 RX ADMIN — Medication 800 MILLIGRAM(S): at 17:22

## 2020-04-19 RX ADMIN — ESCITALOPRAM OXALATE 20 MILLIGRAM(S): 10 TABLET, FILM COATED ORAL at 15:20

## 2020-04-19 RX ADMIN — Medication 300 MILLIGRAM(S): at 20:30

## 2020-04-19 RX ADMIN — ONDANSETRON 4 MILLIGRAM(S): 8 TABLET, FILM COATED ORAL at 03:53

## 2020-04-19 RX ADMIN — SODIUM CHLORIDE 50 MILLILITER(S): 9 INJECTION INTRAMUSCULAR; INTRAVENOUS; SUBCUTANEOUS at 21:16

## 2020-04-19 RX ADMIN — RIVAROXABAN 15 MILLIGRAM(S): KIT at 17:23

## 2020-04-19 NOTE — ED PROVIDER NOTE - OBJECTIVE STATEMENT
89f w hx afib on Xarelto, HLD, dementia, CVA w/ residual left-sided weakness BIBEMS for fever and cough. Pt herself AOx2. C/o right-sided abdominal pain and nausea; denies urinary sx. Denies cough, cp, or SOB. Denies surg hx. No nephrolithiasis in the past.

## 2020-04-19 NOTE — H&P ADULT - PROBLEM SELECTOR PLAN 2
-Secondary to PNA, likely viral r/o COVID-19  -Provide supplemental O2. Escalate to NRB if required, with continuous oximetry monitoring  -Wean off O2 as tolerated  -Obtain daily room air O2 saturations once O2 requirements stabilize  -HFA albuterol Q6 hour PRN via MDI. Would avoid nebulized preparations to limit risk of aerosol formation.  -If escalation of O2 requirements to %, start Tocilizumab   -Monitor vitals

## 2020-04-19 NOTE — ED PROVIDER NOTE - CLINICAL SUMMARY MEDICAL DECISION MAKING FREE TEXT BOX
89f w hx afib on Xarelto, HLD, dementia, CVA w/ residual left-sided weakness BIBEMS for fever and cough.  Pt appears uncomfortable. Afebrile, tachycardic and hyopxic to 80s on room air. Abd soft w/ right-sided ttp. C/f pna/COVID and/or intraabd pathology. Plan for labs, CT, pain/naus ctrl, admit

## 2020-04-19 NOTE — ED PROVIDER NOTE - ATTENDING CONTRIBUTION TO CARE
attending Nery: 89yF h/o Afib on Xarelto, HLD, dementia, CVA w/ residual L sided weakness BIBEMS for fever and cough. Also with R sided abdominal pain. Hypoxic on arrival requiring nasal cannula. Will maintain on supplemental O2, labs, cxr, CT CAP, admit

## 2020-04-19 NOTE — H&P ADULT - HISTORY OF PRESENT ILLNESS
89yo female with hx of Atrial Fibrillation, CVA with left sided weakness, Dementia, presented to the ED with complaints of progressively worsening sob x 6 days. Pt is a poor historian. History provided by pt's sob. Per son, he noted her mom to have symptoms of low grade fever and mild sob on exertion. He was concerned she may have the virus and therefore stayed with her, monitoring her breathing and symptoms. He was in consult with her PMD on a regular basis. She started complaining of abdominal pain and lower back pain as well. He was giving her Tylenol prn for her fever. He states he decided to bring her to ED when he noted her become dyspneic on minimal exertion and even at rest. Initially he was doing breathing exercises with her but states it came to a point where he needed her to be evaluated in the ED. He also states he was giving her pedialyte as she had decrease PO intake but now she was not even drinking that.

## 2020-04-19 NOTE — H&P ADULT - PROBLEM SELECTOR PLAN 5
-Alzheimers vs Vascular Dementia  -Baseline AAOx2  -No behavioral issues noted by pt's son  -Reorient/redirect patient if confused

## 2020-04-19 NOTE — ED ADULT NURSE REASSESSMENT NOTE - NS ED NURSE REASSESS COMMENT FT1
Straight cath for urine. Pt tolerated procedure well
labs resulted, md aware of 3.8 lactate; no further intervention at this time. Pt rouses to voice, asked where she was and wanted to know if she was pregnant. Hx dementia, reoriented- pt calm and cooperative. VSS at this time, sats 94% on 4LNC, awaiting transport to CT
received report from Tabatha GARZA RN. pt resting comfortably in stretcher. Alert, responsive to voice upon assessment, oriented x2. VSS. NAD noted. pt admitted to medicine, pending bed assignment. airborne/contact precautions in place, pending COVID results. plan of care discussed. safety and comfort measures maintained.
Report rec'd w pt seen. pt with artificial nails and poor waveform, o2 probe applied temporally with sats 94-97% on 6LNC. NS bolus held at this point in time per ERRES pending labs. Will follow

## 2020-04-19 NOTE — ED ADULT NURSE NOTE - OBJECTIVE STATEMENT
Pt presents c/o low SPO2 and Right sided abdominal pain. +tenderness to palpation. +nausea upon arrival. Pt medicated with Morphine and Zofran. Good relief of nausea, but pt continues to c/o pain. No further orders at this time. Pt on 6liters O2 via nasal canula. Pt was straight cathed for UA/C&S. 300ml clear, alcira urine out. Awaiting results of UA and CT.

## 2020-04-19 NOTE — H&P ADULT - PROBLEM SELECTOR PLAN 4
-Chronic  -Atrial fibrillation with RVR in the setting of sepsis  -S/p IVF   -Restart home medication Amiodarone  -Continue Xarelto

## 2020-04-19 NOTE — H&P ADULT - PROBLEM SELECTOR PLAN 7
24mins spent discussing advance care planning with pt's son. Explained pt's poor prognosis with <5% survival if she was to decompensate and needed CPR/Intubation.   Pt's son understands and agrees with DNR/DNI  MOLST Completed

## 2020-04-19 NOTE — H&P ADULT - ASSESSMENT
89yo female with hx of Atrial Fibrillation, CVA with left sided weakness, Dementia, presented to the ED with complaints of progressively worsening sob x 6 days, noted to have sepsis with Acute respiratory failure with hypoxia Secondary to PNA, likely viral r/o COVID-19

## 2020-04-19 NOTE — H&P ADULT - PROBLEM SELECTOR PLAN 1
-Tachypneic + Tachycardic Secondary to PNA, likely viral r/o COVID-19  -S/p Gentle IVF in the ED  -Respiratory management as below  -No need to Abx at this time  -Tylenol prn for fever  -Monitor vitals

## 2020-04-19 NOTE — ED PROVIDER NOTE - CARE PLAN
Principal Discharge DX:	Suspected 2019 novel coronavirus infection  Secondary Diagnosis:	Hypoxia  Secondary Diagnosis:	Transaminitis

## 2020-04-19 NOTE — ED PROVIDER NOTE - PROGRESS NOTE DETAILS
Elizabeth Goldberger PGY-3: spoke w/ pt's son and HCP Shalom Echols. He states that pt had fever 100.5 last week and cough that have both largely improved. Has multiple HHA's who tested positive for virus and he felt sx of it last week so assumed they all had. Pt is confused normally and is currently at her baseline mental status. Is also minimally mobile requiring assistance generally, but in last few days has become progressively weaker and requiring increased assistance. Mentions hx renal tumors of uncertain significance + that right-sided abd pain is a new complaint from his mother. Re [facility], states "would want everything done" Elizabeth Goldberger PGY-3: spoke w/ pt's son and HCP Shalom Echols [phone]. He states that pt had fever 100.5 last week and cough that have both largely improved. Has multiple HHA's who tested positive for virus and he felt sx of it last week so assumed they all had. Pt is confused normally and is currently at her baseline mental status. Is also minimally mobile requiring assistance generally, but in last few days has become progressively weaker and requiring increased assistance. Mentions hx renal tumors of uncertain significance + that right-sided abd pain is a new complaint from his mother. Re [facility], states "would want everything done" Elizabeth Goldberger PGY-3: fluids marked as given but only a few cc in. Will hold for hypoxia at this time Elizabeth Goldberger PGY-3: CT w/ COVID lung findings. No other significant acute pathology. Sat well on NC. Will admit

## 2020-04-20 VITALS
HEART RATE: 89 BPM | DIASTOLIC BLOOD PRESSURE: 56 MMHG | OXYGEN SATURATION: 81 % | RESPIRATION RATE: 24 BRPM | SYSTOLIC BLOOD PRESSURE: 92 MMHG | TEMPERATURE: 98 F

## 2020-04-20 RX ORDER — TOCILIZUMAB 20 MG/ML
400 INJECTION, SOLUTION, CONCENTRATE INTRAVENOUS ONCE
Refills: 0 | Status: COMPLETED | OUTPATIENT
Start: 2020-04-20 | End: 2020-04-20

## 2020-04-20 RX ORDER — FUROSEMIDE 40 MG
40 TABLET ORAL ONCE
Refills: 0 | Status: COMPLETED | OUTPATIENT
Start: 2020-04-20 | End: 2020-04-20

## 2020-04-20 RX ORDER — ONDANSETRON 8 MG/1
4 TABLET, FILM COATED ORAL EVERY 6 HOURS
Refills: 0 | Status: DISCONTINUED | OUTPATIENT
Start: 2020-04-20 | End: 2020-04-20

## 2020-04-20 RX ORDER — ENOXAPARIN SODIUM 100 MG/ML
30 INJECTION SUBCUTANEOUS DAILY
Refills: 0 | Status: DISCONTINUED | OUTPATIENT
Start: 2020-04-20 | End: 2020-04-20

## 2020-04-20 RX ORDER — SODIUM CHLORIDE 9 MG/ML
1000 INJECTION INTRAMUSCULAR; INTRAVENOUS; SUBCUTANEOUS
Refills: 0 | Status: DISCONTINUED | OUTPATIENT
Start: 2020-04-20 | End: 2020-04-20

## 2020-04-20 RX ORDER — ACETAMINOPHEN 500 MG
750 TABLET ORAL ONCE
Refills: 0 | Status: DISCONTINUED | OUTPATIENT
Start: 2020-04-20 | End: 2020-04-20

## 2020-04-20 RX ORDER — ONDANSETRON 8 MG/1
4 TABLET, FILM COATED ORAL ONCE
Refills: 0 | Status: COMPLETED | OUTPATIENT
Start: 2020-04-20 | End: 2020-04-20

## 2020-04-20 RX ORDER — ACETAMINOPHEN 500 MG
1000 TABLET ORAL ONCE
Refills: 0 | Status: COMPLETED | OUTPATIENT
Start: 2020-04-20 | End: 2020-04-20

## 2020-04-20 RX ADMIN — Medication 400 MILLIGRAM(S): at 07:09

## 2020-04-20 RX ADMIN — ONDANSETRON 4 MILLIGRAM(S): 8 TABLET, FILM COATED ORAL at 18:28

## 2020-04-20 RX ADMIN — TOCILIZUMAB 100 MILLIGRAM(S): 20 INJECTION, SOLUTION, CONCENTRATE INTRAVENOUS at 10:30

## 2020-04-20 RX ADMIN — Medication 40 MILLIGRAM(S): at 11:35

## 2020-04-20 RX ADMIN — Medication 40 MILLIGRAM(S): at 20:58

## 2020-04-20 RX ADMIN — Medication 40 MILLIGRAM(S): at 07:09

## 2020-04-20 RX ADMIN — ENOXAPARIN SODIUM 30 MILLIGRAM(S): 100 INJECTION SUBCUTANEOUS at 20:58

## 2020-04-20 NOTE — PROGRESS NOTE ADULT - ATTENDING COMMENTS
discussed with son this morning around 9AM.  confirmed DNR/DNI  hypoxic on NRB, will give tocilizumab x 1  son aware of poor prognosis  can try prone if patient can tolerate  trend CMP and inf markers    Please call [facility] with questions [phone].

## 2020-04-20 NOTE — PROVIDER CONTACT NOTE (OTHER) - ASSESSMENT
Pt AOx1 baseline, VSS - hypoxic @ times d/t pt's non compliance to NRB, & restless in bed. @ 0600 CPOX monitor alerted staff pt satting between 50~60% on 15L NRB. Multiple RN's came to bedside and found pt non compliant w/ mask & agitated. Pt AOx1 (name) baseline, VSS - hypoxic @ times d/t pt's non compliance to NRB, & restless in bed. @ 0600 CPOX monitor alerted staff pt satting between 50~60% on 15L NRB. Multiple RN's came to bedside and found pt non compliant w/ mask & agitated.

## 2020-04-20 NOTE — CHART NOTE - NSCHARTNOTEFT_GEN_A_CORE
Patient O2 Sat trending down to low 70s.  Patient with a rectal temp of 100.1  Called rapid to assist with help bringing patient o2 saturation up however unsuccessful.  1x dose of Lasix 40mg IV push and Ofirmev 1000mg IV was given for comfort measures.  Contacted emergency contact on file Shalom(Son) to make aware of patients digressing nature.  Patient is DNR/DNI      Shai Ramsay PA-C  Orthopedic Surgery/ Medicine COVID19  Spectra #[phone]

## 2020-04-20 NOTE — PROGRESS NOTE ADULT - REASON FOR ADMISSION
3/26/2018     PATIENT:  Jose Mcginnis     MRN:  [id number]     YOB: 1943     DATE OF SURGERY:  3/26/2018       SURGEON:  Riky Morrison M.D.    ASSISTANT:  None.    LENS MODEL:  [id number]    DIOPTER POWER:  +20.0    PREOPERATIVE DIAGNOSIS:  Right cataract.     POSTOPERATIVE DIAGNOSIS:  Right cataract.     OPERATIVE PROCEDURE:  Right phacoemulsification with intraocular lens.       ANESTHESIA:  Topical.    ESTIMATED BLOOD LOSS:  0-5 mL.    SPECIMENS:  None.     INDICATIONS:  This patient was seen in the office complaining of decreasing visual acuity of the right eye.  Physical examination revealed a visually significant cataract in the right eye felt to be responsible for the visual symptoms based on a complete eye examination.  Following discussion regarding the risks and benefits of cataract surgery, the patient elected to proceed with cataract extraction of the right eye.     DESCRIPTION OF PROCEDURE:  Following appropriate informed consent, the patient was taken to the operating room and a monitoring device was placed by Anesthesia.  Tetracaine drops were placed in the right eye for topical anesthesia, the eye was prepped and draped in a sterile fashion and a lid speculum was placed on the eye.  A paracentesis was performed and the anterior chamber was filled first with 1% non-preserved Lidocaine followed by Viscoelastic.  A beveled corneal incision was created at the temporal limbus.  A 360-degree capsulorrhexis was created and the lens was hydrodissected using BSS on a cannula.  The cataract was removed using phacoemulsification in a divide and conquer technique without complication.  All remaining cortical material was removed using irrigation and aspiration without complication.  The capsular bag was inflated with Viscoelastic.  The lens was inserted.  The intraocular lens model zcb00 with a diopter power of +20.0 foldable intraocular lens was inserted through the wound into the capsular bag.  All  remaining Viscoelastic was removed using irrigation and aspiration.   0.05 mL of moxifloxacin was injected into the anterior chamber.  The wound was secured using stromal hydration, checked for security and found to be water-tight.  The lid speculum was removed from the eye.       The patient was instructed to minimize exertion, not to rub the eye, to wear the eye shield when asleep, to take Tylenol for pain, and to follow up in the office the following day.     SOB

## 2020-04-20 NOTE — PROGRESS NOTE ADULT - PROBLEM SELECTOR PLAN 2
2/2 COVID-19 PNA  cont NRB with continuous oximetry monitoring  currently hypoxic low 70s on NRB  despite elevated LFTS, will give tocilizumab 400mg x 1 for worsening resp failure and elevated inf markers  trend CMP  pulm consult  DNR/DNI

## 2020-04-20 NOTE — PROGRESS NOTE ADULT - ASSESSMENT
89yo female with hx of Atrial Fibrillation, CVA with left sided weakness, Dementia, presented to the ED with complaints of progressively worsening sob x 6 days, noted to have sepsis with Acute respiratory failure with hypoxia Secondary to PNA, likely viral r/o COVID-19.

## 2020-04-20 NOTE — PROGRESS NOTE ADULT - SUBJECTIVE AND OBJECTIVE BOX
Patient is a 89y old  Female who presents with a chief complaint of SOB (19 Apr 2020 09:22)      SUBJECTIVE / OVERNIGHT EVENTS:    Patient seen and examined. Currently, tachypneic and hypoxic high 70s on NRB. patient confused and cannot provide ros. sp RRT for hypoxia.      Vital Signs Last 24 Hrs  T(C): 37 (20 Apr 2020 08:17), Max: 38 (20 Apr 2020 06:15)  T(F): 98.6 (20 Apr 2020 08:17), Max: 100.4 (20 Apr 2020 06:15)  HR: 112 (20 Apr 2020 08:17) (80 - 120)  BP: 95/63 (20 Apr 2020 08:17) (95/63 - 131/61)  BP(mean): --  RR: 30 (20 Apr 2020 08:17) (19 - 30)  SpO2: 80% (20 Apr 2020 08:17) (80% - 98%)  I&O's Summary    19 Apr 2020 07:01  -  20 Apr 2020 07:00  --------------------------------------------------------  IN: 175 mL / OUT: 500 mL / NET: -325 mL        PE:  GENERAL: awake but confused, tachypnic  CHEST/LUNG: CTABL, No wheeze  HEART: Regular rate and rhythm; no murmur  ABDOMEN: Soft, TTP LUQ, Nondistended; Bowel sounds present  EXTREMITIES:  2+ Peripheral Pulses, No clubbing, cyanosis, or edema  SKIN: No rashes or lesions  NEURO: No focal deficits    LABS:                        14.0   7.80  )-----------( 190      ( 19 Apr 2020 04:29 )             45.2     04-19    143  |  102  |  18  ----------------------------<  143<H>  3.8   |  23  |  0.52    Ca    9.4      19 Apr 2020 04:26    TPro  7.4  /  Alb  3.5  /  TBili  0.6  /  DBili  x   /  AST  188<H>  /  ALT  244<H>  /  AlkPhos  87  04-19      CAPILLARY BLOOD GLUCOSE            Urinalysis Basic - ( 19 Apr 2020 06:27 )    Color: Yellow / Appearance: Slightly Turbid / SG: >1.050 / pH: x  Gluc: x / Ketone: Small  / Bili: Negative / Urobili: Negative   Blood: x / Protein: 100 mg/dL / Nitrite: Negative   Leuk Esterase: Negative / RBC: 1273 /hpf / WBC 10 /HPF   Sq Epi: x / Non Sq Epi: 3 /hpf / Bacteria: Negative        RADIOLOGY & ADDITIONAL TESTS:    Imaging Personally Reviewed:  [x] YES  [ ] NO    Consultant(s) Notes Reviewed:  [x] YES  [ ] NO    MEDICATIONS  (STANDING):  aMIOdarone    Tablet 200 milliGRAM(s) Oral daily  atorvastatin 40 milliGRAM(s) Oral at bedtime  escitalopram 20 milliGRAM(s) Oral daily  hydroxychloroquine   Oral   hydroxychloroquine 400 milliGRAM(s) Oral every 24 hours  mirtazapine 30 milliGRAM(s) Oral at bedtime  rivaroxaban 15 milliGRAM(s) Oral with dinner  sodium chloride 0.9%. 1000 milliLiter(s) (50 mL/Hr) IV Continuous <Continuous>  tocilizumab IVPB 400 milliGRAM(s) IV Intermittent once    MEDICATIONS  (PRN):  acetaminophen   Tablet .. 650 milliGRAM(s) Oral every 4 hours PRN Temp greater or equal to 38.5C (101.3F)  acetaminophen  Suppository .. 650 milliGRAM(s) Rectal every 4 hours PRN Temp greater or equal to 38.5C (101.3F)  ALBUTerol    90 MICROgram(s) HFA Inhaler 2 Puff(s) Inhalation every 4 hours PRN Shortness of Breath and/or Wheezing      Care Discussed with Consultants/Other Providers [x] YES  [ ] NO    HEALTH ISSUES - PROBLEM Dx:  Advance care planning: Advance care planning  Cerebrovascular accident (CVA) due to other mechanism: Cerebrovascular accident (CVA) due to other mechanism  Dementia without behavioral disturbance, unspecified dementia type: Dementia without behavioral disturbance, unspecified dementia type  Other persistent atrial fibrillation: Other persistent atrial fibrillation  Suspected 2019 novel coronavirus infection: Suspected 2019 novel coronavirus infection  Acute respiratory failure with hypoxia: Acute respiratory failure with hypoxia  Sepsis with acute hypoxic respiratory failure without septic shock, due to unspecified organism: Sepsis with acute hypoxic respiratory failure without septic shock, due to unspecified organism

## 2020-04-20 NOTE — DISCHARGE NOTE FOR THE EXPIRED PATIENT - HOSPITAL COURSE
89yo female with hx of Atrial Fibrillation, CVA with left sided weakness, Dementia, presented to the ED on 2020 with complaints of progressively worsening sob x 6 days. Pt is a poor historian. History provided by pt's sob. Per son, he noted her mom to have symptoms of low grade fever and mild sob on exertion. He was concerned she may have the virus and therefore stayed with her, monitoring her breathing and symptoms. He was in consult with her PMD on a regular basis. She started complaining of abdominal pain and lower back pain as well. He was giving her Tylenol prn for her fever. He states he decided to bring her to ED when he noted her become dyspneic on minimal exertion and even at rest. Initially he was doing breathing exercises with her but states it came to a point where he needed her to be evaluated in the ED. He also states he was giving her Pedialyte as she had decrease PO intake but now she was not even drinking that.  Patient admitted to [facility] and was managed on floor for comfort care being patient was DNR/DNI.  Patient remained hypoxic several times and attempts to reposition patient prone lying and side-lying were unsuccessful.  Patient had rapid response called 2x throughout her admission.  Patient  on 20 @ 23:00.

## 2020-04-20 NOTE — RAPID RESPONSE TEAM SUMMARY - NSSITUATIONBACKGROUNDRRT_GEN_ALL_CORE
89yo female with hx of Atrial Fibrillation, CVA with left sided weakness, Dementia, presented to the ED with complaints of progressively worsening sob x 6 days, found COVID-19+ on NRB. Rapid called for hypoxia to 70s. Patient is DNR/DNI.

## 2020-04-20 NOTE — CONSULT NOTE ADULT - SUBJECTIVE AND OBJECTIVE BOX
PULMONARY CONSULT  Mat Levi MD  [phone]    Initial HPI on admission:  HPI:  89yo female with hx of Atrial Fibrillation, CVA with left sided weakness, Dementia, presented to the ED with complaints of progressively worsening sob x 6 days. Pt is a poor historian. History provided by pt's sob. Per son, he noted her mom to have symptoms of low grade fever and mild sob on exertion. He was concerned she may have the virus and therefore stayed with her, monitoring her breathing and symptoms. He was in consult with her PMD on a regular basis. She started complaining of abdominal pain and lower back pain as well. He was giving her Tylenol prn for her fever. He states he decided to bring her to ED when he noted her become dyspneic on minimal exertion and even at rest. Initially he was doing breathing exercises with her but states it came to a point where he needed her to be evaluated in the ED. He also states he was giving her pedialyte as she had decrease PO intake but now she was not even drinking that.         PAST MEDICAL & SURGICAL HISTORY:  Anxiety  Atrial fibrillation  Hyperlipidemia  Familial tremor  No significant past surgical history    Allergies    No Known Allergies    Intolerances      FAMILY HISTORY:  FH: HTN (hypertension)    REVIEW OF SYSTEMS  Unable to obtain due to mental status    Social History (marital status, living situation, occupation, tobacco use, alcohol and drug use, and sexual history): Lives in [facility] alone  Denies smoking, EtOH use Wheelchair bound	     Tobacco Screening:  · Core Measure Site	No	    Risk Assessment:    Present on Admission:  Deep Venous Thrombosis	no	  Pulmonary Embolus	no	     Medications:  MEDICATIONS  (STANDING):  aMIOdarone    Tablet 200 milliGRAM(s) Oral daily  atorvastatin 40 milliGRAM(s) Oral at bedtime  escitalopram 20 milliGRAM(s) Oral daily  hydroxychloroquine   Oral   hydroxychloroquine 400 milliGRAM(s) Oral every 24 hours  mirtazapine 30 milliGRAM(s) Oral at bedtime  rivaroxaban 15 milliGRAM(s) Oral with dinner  sodium chloride 0.9%. 1000 milliLiter(s) (50 mL/Hr) IV Continuous <Continuous>  tocilizumab IVPB 400 milliGRAM(s) IV Intermittent once    MEDICATIONS  (PRN):  acetaminophen   Tablet .. 650 milliGRAM(s) Oral every 4 hours PRN Temp greater or equal to 38.5C (101.3F)  acetaminophen  Suppository .. 650 milliGRAM(s) Rectal every 4 hours PRN Temp greater or equal to 38.5C (101.3F)  ALBUTerol    90 MICROgram(s) HFA Inhaler 2 Puff(s) Inhalation every 4 hours PRN Shortness of Breath and/or Wheezing    Vital Signs Last 24 Hrs  T(C): 37 (20 Apr 2020 08:17), Max: 38 (20 Apr 2020 06:15)  T(F): 98.6 (20 Apr 2020 08:17), Max: 100.4 (20 Apr 2020 06:15)  HR: 112 (20 Apr 2020 08:17) (80 - 120)  BP: 95/63 (20 Apr 2020 08:17) (95/63 - 131/61)  BP(mean): --  RR: 30 (20 Apr 2020 08:17) (19 - 30)  SpO2: 80% (20 Apr 2020 08:17) (80% - 98%)      04-19 @ 07:01  -  04-20 @ 07:00  --------------------------------------------------------  IN: 175 mL / OUT: 500 mL / NET: -325 mL      LABS:                        14.0   7.80  )-----------( 190      ( 19 Apr 2020 04:29 )             45.2     04-19    143  |  102  |  18  ----------------------------<  143<H>  3.8   |  23  |  0.52    Ca    9.4      19 Apr 2020 04:26    TPro  7.4  /  Alb  3.5  /  TBili  0.6  /  DBili  x   /  AST  188<H>  /  ALT  244<H>  /  AlkPhos  87  04-19        Urinalysis Basic - ( 19 Apr 2020 06:27 )    Color: Yellow / Appearance: Slightly Turbid / SG: >1.050 / pH: x  Gluc: x / Ketone: Small  / Bili: Negative / Urobili: Negative   Blood: x / Protein: 100 mg/dL / Nitrite: Negative   Leuk Esterase: Negative / RBC: 1273 /hpf / WBC 10 /HPF   Sq Epi: x / Non Sq Epi: 3 /hpf / Bacteria: Negative      Procalcitonin, Serum: 0.27 ng/mL (04-19-20 @ 04:26)    CULTURES:  Culture Results:   50,000 - 99,000 CFU/mL Gram Negative Rods (04-19 @ 12:28)      Physical Examination:    General: No acute distress.      HEENT: Pupils equal, reactive to light.  Symmetric.    PULM: Clear to auscultation bilaterally, no significant sputum production    CVS: Regular rate and rhythm, no murmurs, rubs, or gallops    ABD: Soft, nondistended, nontender, normoactive bowel sounds, no masses    EXT: No edema, nontender    SKIN: Warm and well perfused, no rashes noted.    NEURO: Alert, oriented, interactive, nonfocal    RADIOLOGY REVIEWED PERSONALLY  CXR:    CT chest:    PROCEDURE:   CT Angiography of the Chest was performed followed by portal venous phase imaging of the Abdomen and Pelvis.  IV Contrast: 90 ml of Omnipaque 350 was injected intravenously. 10 ml were discarded.  Oral contrast: None.  Sagittal and coronal reformats were performed as well as 3D (MIP) reconstructions.    FINDINGS:    Evaluation degraded by respiratory motion.    CHEST:     LUNGS AND LARGE AIRWAYS: Patent central airways. Extensive patchy and confluent bilateral groundglass opacities with a predominantly peripheral distribution. Subsegmental atelectasis of the bilateral lower lobes.  PLEURA: No pleural effusion.  VESSELS: Good opacification of the pulmonary arterial tree. Evaluation of subsegmental branches mildly degraded by motion. No pulmonary embolism within the confines of this exam. Atherosclerotic calcifications of the aorta and coronary arteries and thoracic aorta is normal in caliber. Main pulmonary artery is not dilated.  HEART: Heart is enlarged. No pericardial effusion.  MEDIASTINUM AND PEREZ: No lymphadenopathy.  CHEST WALL AND LOWER NECK: Within normal limits.    ABDOMEN AND PELVIS:    LIVER: Few tiny subcentimeter hypodense lesions that are too small to characterize.  BILE DUCTS: Normal caliber.  GALLBLADDER: Within normal limits.  SPLEEN: Within normal limits.  PANCREAS: Within normal limits.  ADRENALS: Right adrenal nodule measuring 1.6 x 1.3 cm. Normal left adrenal gland.  KIDNEYS/URETERS: Kidneys enhance symmetricallywithout hydronephrosis. Punctate nonobstructing right intrarenal calculus. Bilateral subcentimeter hypodense foci, too small to characterize.    BLADDER: Within normal limits.  REPRODUCTIVE ORGANS: Calcified uterine fibroid. No adnexal masses    BOWEL: Small hiatal hernia. No bowel obstruction. Appendix is normal.  PERITONEUM: No ascites, pneumoperitoneum, or loculated collection. No mesenteric lymphadenopathy.  VESSELS: Atherosclerotic ossifications of the aortoiliac tree and abdominal aortic branches. Normal caliber abdominal aorta.  RETROPERITONEUM/LYMPH NODES: No lymphadenopathy.    ABDOMINAL WALL: Within normal limits.  BONES: T4 vertebroplasty. Chronic appearing compression deformities of T8 and T11. Degenerative changes of the spine.    IMPRESSION:     Extensive bilateral ground glass opacities. Pattern of groundglass opacity suggests infection including atypical pneumonia/viral infection from atypical agents including COVID-19.    No pulmonary embolism.    No acute intra-abdominalor pelvic findings.  TTE:    PROCEDURE: Transthoracic echocardiogram with 2-D, M-Mode  and complete spectral and color flow Doppler.  INDICATION: Unspecified atrial fibrillation (I48.91)  ------------------------------------------------------------------------  DIMENSIONS:  Dimensions:     Normal Values:  LA:     3.3 cm    2.0 - 4.0 cm  Ao:     2.7 cm    2.0 - 3.8 cm  SEPTUM: 0.9 cm    0.6 - 1.2 cm  PWT:    0.9 cm    0.6 - 1.1 cm  LVIDd:  4.0 cm    3.0 - 5.6 cm  LVIDs:  2.7 cm    1.8 - 4.0 cm  Derived Variables:  LVMI: 69 g/m2  RWT: 0.45  Fractional short: 33 %  Ejection Fraction (Teicholtz): 61 %  ------------------------------------------------------------------------  OBSERVATIONS:  Mitral Valve: Mitral annular calcification, otherwise  normal mitral valve. Moderate mitral regurgitation.  Aortic Root: Normal aortic root.  Aortic Valve: Calcified trileaflet aortic valve with  grossly moderately decreased opening. Peak transaortic  valve gradient equals 24 mm Hg, mean transaortic valve  gradient equals 14 mm Hg.  Left Atrium: Moderately dilated left atrium.  LA volume  index = 43 cc/m2.  Left Ventricle: Normal left ventricular systolic function.  No segmental wall motion abnormalities. Increased relative  wall thickness with normal left ventricular mass index,  consistent with concentric left ventricular remodeling.  Right Heart: Mild right atrial enlargement. Normal right  ventricular size and function. Normal tricuspid valve.  Moderate tricuspid regurgitation. Normal pulmonic valve.  Pericardium/PleuraNormal pericardium with no pericardial  effusion.  Hemodynamic: Estimated right ventricular systolic pressure  equals 58 mm Hg, assuming right atrial pressure equals 10  mm Hg, consistent with moderate pulmonary hypertension.  ------------------------------------------------------------------------  CONCLUSIONS:  1. Mitral annular calcification, otherwise normal mitral  valve.Moderate mitral regurgitation.  2. Calcified trileaflet aortic valve with grossly  moderately decreased opening. Peak transaortic valve  gradient equals 24 mm Hg, mean transaortic valve gradient  equals 14 mm Hg.  3. Moderately dilated left atrium.  LA volume index = 43  cc/m2.  4. Increased relative wall thickness with normal left  ventricular mass index, consistent with concentric left  ventricular remodeling.  5. Normal left ventricular systolic function. No segmental  wall motion abnormalities.  6. Mild right atrial enlargement.  7. Normal right ventricular size and function.  8. Normal tricuspid valve.  Moderate tricuspid  regurgitation.  9. Estimated pulmonary artery systolic pressure equals 58  mm Hg, assuming right atrial pressure equals 10  mm Hg,  consistent with moderate pulmonary hypertension. PULMONARY CONSULT  Mat Levi MD  [phone]    Initial HPI on admission:  HPI:  89yo female with hx of Atrial Fibrillation, CVA with left sided weakness, Dementia, presented to the ED with complaints of progressively worsening sob x 6 days. Pt is a poor historian. History provided by pt's sob. Per son, he noted her mom to have symptoms of low grade fever and mild sob on exertion. He was concerned she may have the virus and therefore stayed with her, monitoring her breathing and symptoms. He was in consult with her PMD on a regular basis. She started complaining of abdominal pain and lower back pain as well. He was giving her Tylenol prn for her fever. He states he decided to bring her to ED when he noted her become dyspneic on minimal exertion and even at rest. Initially he was doing breathing exercises with her but states it came to a point where he needed her to be evaluated in the ED. He also states he was giving her pedialyte as she had decrease PO intake but now she was not even drinking that.     Patient currently DNR/DNI  O2 sats on NRB in mid 80's at time of visit    PAST MEDICAL & SURGICAL HISTORY:  Anxiety  Atrial fibrillation  Hyperlipidemia  Familial tremor  No significant past surgical history    Allergies    No Known Allergies    Intolerances      FAMILY HISTORY:  FH: HTN (hypertension)    REVIEW OF SYSTEMS  Unable to obtain due to mental status    Social History (marital status, living situation, occupation, tobacco use, alcohol and drug use, and sexual history): Lives in [facility] alone  Denies smoking, EtOH use Wheelchair bound	     Tobacco Screening:  · Core Measure Site	No	    Risk Assessment:    Present on Admission:  Deep Venous Thrombosis	no	  Pulmonary Embolus	no	     Medications:  MEDICATIONS  (STANDING):  aMIOdarone    Tablet 200 milliGRAM(s) Oral daily  atorvastatin 40 milliGRAM(s) Oral at bedtime  escitalopram 20 milliGRAM(s) Oral daily  hydroxychloroquine   Oral   hydroxychloroquine 400 milliGRAM(s) Oral every 24 hours  mirtazapine 30 milliGRAM(s) Oral at bedtime  rivaroxaban 15 milliGRAM(s) Oral with dinner  sodium chloride 0.9%. 1000 milliLiter(s) (50 mL/Hr) IV Continuous <Continuous>  tocilizumab IVPB 400 milliGRAM(s) IV Intermittent once    MEDICATIONS  (PRN):  acetaminophen   Tablet .. 650 milliGRAM(s) Oral every 4 hours PRN Temp greater or equal to 38.5C (101.3F)  acetaminophen  Suppository .. 650 milliGRAM(s) Rectal every 4 hours PRN Temp greater or equal to 38.5C (101.3F)  ALBUTerol    90 MICROgram(s) HFA Inhaler 2 Puff(s) Inhalation every 4 hours PRN Shortness of Breath and/or Wheezing    Vital Signs Last 24 Hrs  T(C): 37 (20 Apr 2020 08:17), Max: 38 (20 Apr 2020 06:15)  T(F): 98.6 (20 Apr 2020 08:17), Max: 100.4 (20 Apr 2020 06:15)  HR: 112 (20 Apr 2020 08:17) (80 - 120)  BP: 95/63 (20 Apr 2020 08:17) (95/63 - 131/61)  BP(mean): --  RR: 30 (20 Apr 2020 08:17) (19 - 30)  SpO2: 80% (20 Apr 2020 08:17) (80% - 98%)      04-19 @ 07:01  -  04-20 @ 07:00  --------------------------------------------------------  IN: 175 mL / OUT: 500 mL / NET: -325 mL      LABS:                        14.0   7.80  )-----------( 190      ( 19 Apr 2020 04:29 )             45.2     04-19    143  |  102  |  18  ----------------------------<  143<H>  3.8   |  23  |  0.52    Ca    9.4      19 Apr 2020 04:26    TPro  7.4  /  Alb  3.5  /  TBili  0.6  /  DBili  x   /  AST  188<H>  /  ALT  244<H>  /  AlkPhos  87  04-19        Urinalysis Basic - ( 19 Apr 2020 06:27 )    Color: Yellow / Appearance: Slightly Turbid / SG: >1.050 / pH: x  Gluc: x / Ketone: Small  / Bili: Negative / Urobili: Negative   Blood: x / Protein: 100 mg/dL / Nitrite: Negative   Leuk Esterase: Negative / RBC: 1273 /hpf / WBC 10 /HPF   Sq Epi: x / Non Sq Epi: 3 /hpf / Bacteria: Negative      Procalcitonin, Serum: 0.27 ng/mL (04-19-20 @ 04:26)    CULTURES:  Culture Results:   50,000 - 99,000 CFU/mL Gram Negative Rods (04-19 @ 12:28)      Physical Examination:    General: Lethargic, tachypneic, not verbally responsive    HEENT: Pupils equal, reactive to light.  Symmetric.    PULM: No wheeze    CVS: Regular rate and rhythm, no murmurs, rubs, or gallops    ABD: Soft, nondistended, nontender, normoactive bowel sounds, no masses    EXT: No edema, nontender    SKIN: Warm and well perfused, no rashes noted.    NEURO: Alert, oriented, interactive, nonfocal    RADIOLOGY REVIEWED PERSONALLY  CXR:    CT chest:    PROCEDURE:   CT Angiography of the Chest was performed followed by portal venous phase imaging of the Abdomen and Pelvis.  IV Contrast: 90 ml of Omnipaque 350 was injected intravenously. 10 ml were discarded.  Oral contrast: None.  Sagittal and coronal reformats were performed as well as 3D (MIP) reconstructions.    FINDINGS:    Evaluation degraded by respiratory motion.    CHEST:     LUNGS AND LARGE AIRWAYS: Patent central airways. Extensive patchy and confluent bilateral groundglass opacities with a predominantly peripheral distribution. Subsegmental atelectasis of the bilateral lower lobes.  PLEURA: No pleural effusion.  VESSELS: Good opacification of the pulmonary arterial tree. Evaluation of subsegmental branches mildly degraded by motion. No pulmonary embolism within the confines of this exam. Atherosclerotic calcifications of the aorta and coronary arteries and thoracic aorta is normal in caliber. Main pulmonary artery is not dilated.  HEART: Heart is enlarged. No pericardial effusion.  MEDIASTINUM AND PEREZ: No lymphadenopathy.  CHEST WALL AND LOWER NECK: Within normal limits.    ABDOMEN AND PELVIS:    LIVER: Few tiny subcentimeter hypodense lesions that are too small to characterize.  BILE DUCTS: Normal caliber.  GALLBLADDER: Within normal limits.  SPLEEN: Within normal limits.  PANCREAS: Within normal limits.  ADRENALS: Right adrenal nodule measuring 1.6 x 1.3 cm. Normal left adrenal gland.  KIDNEYS/URETERS: Kidneys enhance symmetricallywithout hydronephrosis. Punctate nonobstructing right intrarenal calculus. Bilateral subcentimeter hypodense foci, too small to characterize.    BLADDER: Within normal limits.  REPRODUCTIVE ORGANS: Calcified uterine fibroid. No adnexal masses    BOWEL: Small hiatal hernia. No bowel obstruction. Appendix is normal.  PERITONEUM: No ascites, pneumoperitoneum, or loculated collection. No mesenteric lymphadenopathy.  VESSELS: Atherosclerotic ossifications of the aortoiliac tree and abdominal aortic branches. Normal caliber abdominal aorta.  RETROPERITONEUM/LYMPH NODES: No lymphadenopathy.    ABDOMINAL WALL: Within normal limits.  BONES: T4 vertebroplasty. Chronic appearing compression deformities of T8 and T11. Degenerative changes of the spine.    IMPRESSION:     Extensive bilateral ground glass opacities. Pattern of groundglass opacity suggests infection including atypical pneumonia/viral infection from atypical agents including COVID-19.    No pulmonary embolism.    No acute intra-abdominalor pelvic findings.  TTE:    PROCEDURE: Transthoracic echocardiogram with 2-D, M-Mode  and complete spectral and color flow Doppler.  INDICATION: Unspecified atrial fibrillation (I48.91)  ------------------------------------------------------------------------  DIMENSIONS:  Dimensions:     Normal Values:  LA:     3.3 cm    2.0 - 4.0 cm  Ao:     2.7 cm    2.0 - 3.8 cm  SEPTUM: 0.9 cm    0.6 - 1.2 cm  PWT:    0.9 cm    0.6 - 1.1 cm  LVIDd:  4.0 cm    3.0 - 5.6 cm  LVIDs:  2.7 cm    1.8 - 4.0 cm  Derived Variables:  LVMI: 69 g/m2  RWT: 0.45  Fractional short: 33 %  Ejection Fraction (Teicholtz): 61 %  ------------------------------------------------------------------------  OBSERVATIONS:  Mitral Valve: Mitral annular calcification, otherwise  normal mitral valve. Moderate mitral regurgitation.  Aortic Root: Normal aortic root.  Aortic Valve: Calcified trileaflet aortic valve with  grossly moderately decreased opening. Peak transaortic  valve gradient equals 24 mm Hg, mean transaortic valve  gradient equals 14 mm Hg.  Left Atrium: Moderately dilated left atrium.  LA volume  index = 43 cc/m2.  Left Ventricle: Normal left ventricular systolic function.  No segmental wall motion abnormalities. Increased relative  wall thickness with normal left ventricular mass index,  consistent with concentric left ventricular remodeling.  Right Heart: Mild right atrial enlargement. Normal right  ventricular size and function. Normal tricuspid valve.  Moderate tricuspid regurgitation. Normal pulmonic valve.  Pericardium/PleuraNormal pericardium with no pericardial  effusion.  Hemodynamic: Estimated right ventricular systolic pressure  equals 58 mm Hg, assuming right atrial pressure equals 10  mm Hg, consistent with moderate pulmonary hypertension.  ------------------------------------------------------------------------  CONCLUSIONS:  1. Mitral annular calcification, otherwise normal mitral  valve.Moderate mitral regurgitation.  2. Calcified trileaflet aortic valve with grossly  moderately decreased opening. Peak transaortic valve  gradient equals 24 mm Hg, mean transaortic valve gradient  equals 14 mm Hg.  3. Moderately dilated left atrium.  LA volume index = 43  cc/m2.  4. Increased relative wall thickness with normal left  ventricular mass index, consistent with concentric left  ventricular remodeling.  5. Normal left ventricular systolic function. No segmental  wall motion abnormalities.  6. Mild right atrial enlargement.  7. Normal right ventricular size and function.  8. Normal tricuspid valve.  Moderate tricuspid  regurgitation.  9. Estimated pulmonary artery systolic pressure equals 58  mm Hg, assuming right atrial pressure equals 10  mm Hg,  consistent with moderate pulmonary hypertension.

## 2020-04-20 NOTE — PROGRESS NOTE ADULT - PROBLEM SELECTOR PLAN 1
COVID-19 pna  Tylenol prn for fever COVID-19 pna  Tylenol prn for fever  urine cx <100,00 GNR, does not qualify for abx

## 2020-04-20 NOTE — CONSULT NOTE ADULT - ASSESSMENT
Acute hypoxemic respiratory failue  COVID 19 viral peumonia, bilateral  AF on AC  Dementia  s/p CVA    REC    Tocilimuzab started: 400 mg IV X1  Will add solumedrol 40 q 12 X 3 days  Prone and laterl decubitus position as tolerated  Continue NRB  Prognosis grave

## 2020-04-20 NOTE — CHART NOTE - NSCHARTNOTEFT_GEN_A_CORE
Was informed by nurse that patients pulse oximetry was reading pulseless activity.  Went into room bedside with RN and patient appeared blue in coloration.  Unable to palpate pulse and no electrical activity appreciated on EKG.  Patient pupils non-reactive.  Time of death called 23:00 on 4/20/2020.  Spoke with son Shalom who is aware and stopping by for 15 minutes to see mother.    Shai Ramsay PA-C  [place] [id number] Medicine Team [id number]

## 2020-04-20 NOTE — RAPID RESPONSE TEAM SUMMARY - NSADDTLFINDINGSRRT_GEN_ALL_CORE
89yo female with hx of Atrial Fibrillation, CVA with left sided weakness, Dementia, presented to the ED with complaints of progressively worsening sob x 6 days, found COVID-19+ on NRB. Rapid called for hypoxia to 70s. Patient is DNR/DNI, cannot tolerate prone positioning. Gave 1G tylenol for fever to 100.8. Patient has elevated LFTs so limit 2G in 24 hrs but last was approximately 12 hrs prior. Gave 40 IV lasix. Turned patient to side as tolerated. Informed primary team to contact family as patient is DNR/DNI and given persistent hypoxia this is likely patient decompensating; primary team to contact family and notify them of decompensation and arrange for facetime/phone call.

## 2020-04-21 LAB
-  AMIKACIN: SIGNIFICANT CHANGE UP
-  AMIKACIN: SIGNIFICANT CHANGE UP
-  AMPICILLIN/SULBACTAM: SIGNIFICANT CHANGE UP
-  AMPICILLIN/SULBACTAM: SIGNIFICANT CHANGE UP
-  AMPICILLIN: SIGNIFICANT CHANGE UP
-  AMPICILLIN: SIGNIFICANT CHANGE UP
-  AZTREONAM: SIGNIFICANT CHANGE UP
-  AZTREONAM: SIGNIFICANT CHANGE UP
-  CEFAZOLIN: SIGNIFICANT CHANGE UP
-  CEFAZOLIN: SIGNIFICANT CHANGE UP
-  CEFEPIME: SIGNIFICANT CHANGE UP
-  CEFEPIME: SIGNIFICANT CHANGE UP
-  CEFOXITIN: SIGNIFICANT CHANGE UP
-  CEFOXITIN: SIGNIFICANT CHANGE UP
-  CEFTRIAXONE: SIGNIFICANT CHANGE UP
-  CEFTRIAXONE: SIGNIFICANT CHANGE UP
-  CIPROFLOXACIN: SIGNIFICANT CHANGE UP
-  CIPROFLOXACIN: SIGNIFICANT CHANGE UP
-  GENTAMICIN: SIGNIFICANT CHANGE UP
-  GENTAMICIN: SIGNIFICANT CHANGE UP
-  IMIPENEM: SIGNIFICANT CHANGE UP
-  IMIPENEM: SIGNIFICANT CHANGE UP
-  LEVOFLOXACIN: SIGNIFICANT CHANGE UP
-  LEVOFLOXACIN: SIGNIFICANT CHANGE UP
-  MEROPENEM: SIGNIFICANT CHANGE UP
-  MEROPENEM: SIGNIFICANT CHANGE UP
-  NITROFURANTOIN: SIGNIFICANT CHANGE UP
-  NITROFURANTOIN: SIGNIFICANT CHANGE UP
-  PIPERACILLIN/TAZOBACTAM: SIGNIFICANT CHANGE UP
-  PIPERACILLIN/TAZOBACTAM: SIGNIFICANT CHANGE UP
-  TIGECYCLINE: SIGNIFICANT CHANGE UP
-  TIGECYCLINE: SIGNIFICANT CHANGE UP
-  TOBRAMYCIN: SIGNIFICANT CHANGE UP
-  TOBRAMYCIN: SIGNIFICANT CHANGE UP
-  TRIMETHOPRIM/SULFAMETHOXAZOLE: SIGNIFICANT CHANGE UP
-  TRIMETHOPRIM/SULFAMETHOXAZOLE: SIGNIFICANT CHANGE UP
CULTURE RESULTS: SIGNIFICANT CHANGE UP
METHOD TYPE: SIGNIFICANT CHANGE UP
METHOD TYPE: SIGNIFICANT CHANGE UP
ORGANISM # SPEC MICROSCOPIC CNT: SIGNIFICANT CHANGE UP
SPECIMEN SOURCE: SIGNIFICANT CHANGE UP

## 2020-06-02 PROCEDURE — 82947 ASSAY GLUCOSE BLOOD QUANT: CPT

## 2020-06-02 PROCEDURE — 82728 ASSAY OF FERRITIN: CPT

## 2020-06-02 PROCEDURE — 82330 ASSAY OF CALCIUM: CPT

## 2020-06-02 PROCEDURE — 83690 ASSAY OF LIPASE: CPT

## 2020-06-02 PROCEDURE — 85384 FIBRINOGEN ACTIVITY: CPT

## 2020-06-02 PROCEDURE — 87086 URINE CULTURE/COLONY COUNT: CPT

## 2020-06-02 PROCEDURE — 80053 COMPREHEN METABOLIC PANEL: CPT

## 2020-06-02 PROCEDURE — 96375 TX/PRO/DX INJ NEW DRUG ADDON: CPT | Mod: XU

## 2020-06-02 PROCEDURE — 87186 SC STD MICRODIL/AGAR DIL: CPT

## 2020-06-02 PROCEDURE — 83605 ASSAY OF LACTIC ACID: CPT

## 2020-06-02 PROCEDURE — 99285 EMERGENCY DEPT VISIT HI MDM: CPT | Mod: 25

## 2020-06-02 PROCEDURE — 96374 THER/PROPH/DIAG INJ IV PUSH: CPT | Mod: XU

## 2020-06-02 PROCEDURE — 51701 INSERT BLADDER CATHETER: CPT

## 2020-06-02 PROCEDURE — 85027 COMPLETE CBC AUTOMATED: CPT

## 2020-06-02 PROCEDURE — 83615 LACTATE (LD) (LDH) ENZYME: CPT

## 2020-06-02 PROCEDURE — 71045 X-RAY EXAM CHEST 1 VIEW: CPT

## 2020-06-02 PROCEDURE — 71275 CT ANGIOGRAPHY CHEST: CPT

## 2020-06-02 PROCEDURE — 86140 C-REACTIVE PROTEIN: CPT

## 2020-06-02 PROCEDURE — 85014 HEMATOCRIT: CPT

## 2020-06-02 PROCEDURE — 82435 ASSAY OF BLOOD CHLORIDE: CPT

## 2020-06-02 PROCEDURE — 87635 SARS-COV-2 COVID-19 AMP PRB: CPT

## 2020-06-02 PROCEDURE — 84145 PROCALCITONIN (PCT): CPT

## 2020-06-02 PROCEDURE — 85379 FIBRIN DEGRADATION QUANT: CPT

## 2020-06-02 PROCEDURE — 93005 ELECTROCARDIOGRAM TRACING: CPT | Mod: XU

## 2020-06-02 PROCEDURE — 74177 CT ABD & PELVIS W/CONTRAST: CPT

## 2020-06-02 PROCEDURE — 84132 ASSAY OF SERUM POTASSIUM: CPT

## 2020-06-02 PROCEDURE — 82803 BLOOD GASES ANY COMBINATION: CPT

## 2020-06-02 PROCEDURE — 81001 URINALYSIS AUTO W/SCOPE: CPT

## 2020-06-02 PROCEDURE — 84295 ASSAY OF SERUM SODIUM: CPT

## 2020-07-06 NOTE — PHYSICAL THERAPY INITIAL EVALUATION ADULT - MD/RN NOTIFIED
What Type Of Note Output Would You Prefer (Optional)?: Standard Output What Is The Reason For Today's Visit?: Full Body Skin Examination What Is The Reason For Today's Visit? (Being Monitored For X): concerning skin lesions on an annual basis Additional History: Pt also complains of skin lesion located on the R axillary skin. The lesion is enlarging and now rubs against clothing and skin enough to become red, painful, and inflamed. yes

## 2021-10-25 NOTE — ED ADULT NURSE NOTE - ALCOHOL USE HISTORY SINGLE SELECT

## 2022-08-15 NOTE — ED PROVIDER NOTE - CONSTITUTIONAL MENTATION
awake Bilateral Helical Rim Advancement Flap Text: The defect edges were debeveled with a #15 blade scalpel.  Given the location of the defect and the proximity to free margins (helical rim) a bilateral helical rim advancement flap was deemed most appropriate.  Using a sterile surgical marker, the appropriate advancement flaps were drawn incorporating the defect and placing the expected incisions between the helical rim and antihelix where possible.  The area thus outlined was incised through and through with a #15 scalpel blade.  With a skin hook and iris scissors, the flaps were gently and sharply undermined and freed up.

## 2022-09-15 NOTE — DISCHARGE NOTE ADULT - NS AS DC STROKE PERSONAL RISK FACTORS
[facility] Call Center    Phone Message    May a detailed message be left on voicemail: yes     Reason for Call: Medication Refill Request    Has the patient contacted the pharmacy for the refill? Yes     Name of medication being requested: Clozepam    Provider who prescribed the medication: Gagandeep    Pharmacy:      [facility] [id number] [place]  [place] - [address]      Date medication is needed: ASAP -     Reminder - this RX needs to go to [facility] (quantity of 90 for a month supply 3mg/day)       Action Taken: Other: nursing pool    Travel Screening: Not Applicable           Follow Up:  Routed to provider as she just had an appointment. Patient also called requesting her hydroxyzine prescription be updated. Med Management tab indicates that patient is taking 25 mg TID PRN, but previous visit notes state 50 mg TID PRN.                                                            
atrial fibrillation/high cholesterol

## 2023-02-28 NOTE — ED ADULT TRIAGE NOTE - SPO2 (%)
Patient was seen exclusively by nurse practitioner I had no involvement in case however I was present department for any questions or may have arise     Vinny Garcia MD  02/27/23 [id number]

## 2023-10-27 NOTE — ED ADULT NURSE NOTE - NS PRO AD NO ADVANCE DIRECTIVE
Start Time: 09:48 on 10/26/2023  End Time: 6:30 on 10/27/2023    History:    Seizure like activity.    Medications: None listed.    Recording Technique:     The patient underwent continuous Video/EEG monitoring using a cable telemetry system [employer].  The EEG was recorded from 21 electrodes using the standard 10/20 placement, with EKG.  Time synchronized digital video recording was done simultaneously with EEG recording.    The EEG was continuously sampled on disk, and spike detection and seizure detection algorithms marked portions of the EEG for further analysis offline.  Video data was stored on disk for important clinical events (indicated by manual pushbutton) and for periods identified by the seizure detection algorithm, and analyzed offline.      Video and EEG data were reviewed by the electroencephalographer on a daily basis, and selected segments were archived on compact disc.      The patient was attended by an EEG technician for eight to ten hours per day.  Patients were observed by the epilepsy nursing staff 24 hours per day.  The epilepsy center neurologist was available in person or on call 24 hours per day during the period of monitoring.      Background in wakefulness:   The background activity during wakefulness was well organized and characterized by the presence of well-modulated 9 Hz rhythm of 40 microvolts amplitude that appeared symmetrically over both posterior hemispheres and was attenuated with eye opening. A normal anterior to posterior gradient was present.    Background in drowsiness/sleep:  As the patient became drowsy, there was an attenuation of the background and the appearance of widespread, irregular slower frequency activity.  Stage II sleep was marked by symmetric age appropriate spindles. Normal slow wave sleep was achieved.     Slowing:  No focal slowing was present. No generalized slowing was present.     Interictal Activity:    None.      Patient Events/ Ictal Activity: No push button events or seizures were recorded during the monitoring period.      Activation Procedures:  Not done.      EKG:  No clear abnormalities were noted.     Impression:  This is a normal video EEG study.     Clinical Correlation:   This is a normal VEEG study.  No seizures were recorded during the monitoring period.     No

## 2024-01-19 NOTE — ED PROVIDER NOTE - NS ED ROS FT
Please advise on an early refill   Constitutional: no fever  Eyes: no conjunctivitis  Ears: no ear pain   Nose: no nasal congestion, Mouth/Throat: no throat pain, Neck: no stiffness  Cardiovascular: no chest pain  Chest: no cough  Gastrointestinal: no abdominal pain, no vomiting and diarrhea  MSK: no joint pain  : no dysuria  Skin: no rash  Neuro: As noted in hpi   All other review of systems negative except as noted in HPI

## 2024-02-27 NOTE — SWALLOW BEDSIDE ASSESSMENT ADULT - PHARYNGEAL PHASE
See 2/20 TE. Called pt. She said she is stressed with school so she thinks that is what is causing her symptoms. She said the symptoms have been going on for a year but have worsened a couple weeks ago. She is having more frequent chest pain at night and palpitations. She wakes up in the middle of the night with chest pain. She thinks about her breathing and it improves somewhat. She started insulin but it has not helped. She is trying to get off birth control in case that is worsening it. She goes to the gym and she feels like she is going to pass out, throws up, and has chest pain. She has worsening SOB when exercising and gets out of breath fast. She has to take more breaks at the gym. She is cocnstantly dizzy and lightheaded. The nausea/vomiting is new for her. The chest pain is now radiating into her L arm and she gets numbness/tingling in the arm. Due to worsening chest pain and new symptoms, writer advised pt to go to [facility] for evaluation. Pt verbalized understanding and said she will go today. No further questions or concerns at this time. Decreased laryngeal elevation Cough post oral intake/Decreased laryngeal elevation

## 2024-06-27 NOTE — H&P ADULT - PROBLEM SELECTOR PLAN 3
Addended by: HARSHAL WRIGHT on: 6/27/2024 02:29 PM     Modules accepted: Orders     -High suspicion for COVID  -CTA Chest with extensive b/l opacities. CRP/Ferritin/PCT/LDH elevated  -Respiratory support management as above  -Maintain isolation  -Start Plaquenil if COVID positive  -If escalation of O2 requirements to %, start Tocilizumab   -Goals of Care discussion had with pt's son; DNR/DNI

## 2024-10-02 NOTE — DISCHARGE NOTE ADULT - VISION (WITH CORRECTIVE LENSES IF THE PATIENT USUALLY WEARS THEM):
Continued Stay Note  [facility]     Patient Name: Tucker Knox  MRN: [id number]  Today's Date: 10/2/2024    Admit Date: 9/28/2024    Plan: [facility]   Discharge Plan       Row Name 10/02/24 1259       Plan    Plan [facility]    Patient/Family in Agreement with Plan yes    Plan Comments [facility] has accepted pt.  Awaiting response from pt to see if he will agree to go to facility.  Precert will be needed prior to dc; MD please inform SW when to start.      Row Name 10/02/24 1204       Plan    Plan SNF    Patient/Family in Agreement with Plan yes    Plan Comments Referral pending at [facility]; admissions is reviewing and will let SW know shortly.                   Discharge Codes    No documentation.                       Estela Mendez, LULUW     Partially impaired: cannot see medication labels or newsprint, but can see obstacles in path, and the surrounding layout; can count fingers at arm's length